# Patient Record
Sex: MALE | Race: WHITE | Employment: OTHER | URBAN - METROPOLITAN AREA
[De-identification: names, ages, dates, MRNs, and addresses within clinical notes are randomized per-mention and may not be internally consistent; named-entity substitution may affect disease eponyms.]

---

## 2021-10-01 ENCOUNTER — HOSPITAL ENCOUNTER (OUTPATIENT)
Dept: PREADMISSION TESTING | Age: 86
Discharge: HOME | End: 2021-10-01
Payer: MEDICARE

## 2021-10-01 ENCOUNTER — TRANSCRIBE ORDER (OUTPATIENT)
Dept: REGISTRATION | Age: 86
End: 2021-10-01

## 2021-10-01 DIAGNOSIS — Z01.812 BLOOD TESTS PRIOR TO TREATMENT OR PROCEDURE: ICD-10-CM

## 2021-10-01 DIAGNOSIS — R31.0 GROSS HEMATURIA: ICD-10-CM

## 2021-10-01 DIAGNOSIS — D41.4 NEOPLASM OF UNCERTAIN BEHAVIOR OF BLADDER: ICD-10-CM

## 2021-10-01 DIAGNOSIS — Z01.812 BLOOD TESTS PRIOR TO TREATMENT OR PROCEDURE: Primary | ICD-10-CM

## 2021-10-01 DIAGNOSIS — R31.0 GROSS HEMATURIA: Primary | ICD-10-CM

## 2021-10-01 LAB
ANION GAP SERPL CALC-SCNC: 9 MMOL/L (ref 3–18)
BASOPHILS # BLD: 0.1 K/UL (ref 0–0.1)
BASOPHILS NFR BLD: 1 % (ref 0–2)
BUN SERPL-MCNC: 15 MG/DL (ref 7–18)
BUN/CREAT SERPL: 12 (ref 12–20)
CALCIUM SERPL-MCNC: 9 MG/DL (ref 8.5–10.1)
CHLORIDE SERPL-SCNC: 101 MMOL/L (ref 100–111)
CO2 SERPL-SCNC: 26 MMOL/L (ref 21–32)
CREAT SERPL-MCNC: 1.23 MG/DL (ref 0.6–1.3)
DIFFERENTIAL METHOD BLD: ABNORMAL
EOSINOPHIL # BLD: 0.2 K/UL (ref 0–0.4)
EOSINOPHIL NFR BLD: 2 % (ref 0–5)
ERYTHROCYTE [DISTWIDTH] IN BLOOD BY AUTOMATED COUNT: 13.9 % (ref 11.6–14.5)
GLUCOSE SERPL-MCNC: 96 MG/DL (ref 74–99)
HCT VFR BLD AUTO: 40.4 % (ref 36–48)
HGB BLD-MCNC: 13.6 G/DL (ref 13–16)
LYMPHOCYTES # BLD: 2.1 K/UL (ref 0.9–3.6)
LYMPHOCYTES NFR BLD: 29 % (ref 21–52)
MCH RBC QN AUTO: 37 PG (ref 24–34)
MCHC RBC AUTO-ENTMCNC: 33.7 G/DL (ref 31–37)
MCV RBC AUTO: 109.8 FL (ref 78–100)
MONOCYTES # BLD: 1 K/UL (ref 0.05–1.2)
MONOCYTES NFR BLD: 14 % (ref 3–10)
NEUTS SEG # BLD: 3.7 K/UL (ref 1.8–8)
NEUTS SEG NFR BLD: 53 % (ref 40–73)
PLATELET # BLD AUTO: 206 K/UL (ref 135–420)
PMV BLD AUTO: 9.7 FL (ref 9.2–11.8)
POTASSIUM SERPL-SCNC: 4.6 MMOL/L (ref 3.5–5.5)
RBC # BLD AUTO: 3.68 M/UL (ref 4.35–5.65)
SODIUM SERPL-SCNC: 136 MMOL/L (ref 136–145)
WBC # BLD AUTO: 7 K/UL (ref 4.6–13.2)

## 2021-10-01 PROCEDURE — 36415 COLL VENOUS BLD VENIPUNCTURE: CPT

## 2021-10-01 PROCEDURE — 80048 BASIC METABOLIC PNL TOTAL CA: CPT

## 2021-10-01 PROCEDURE — 93005 ELECTROCARDIOGRAM TRACING: CPT

## 2021-10-01 PROCEDURE — 85025 COMPLETE CBC W/AUTO DIFF WBC: CPT

## 2021-10-02 LAB
ATRIAL RATE: 65 BPM
CALCULATED P AXIS, ECG09: 71 DEGREES
CALCULATED R AXIS, ECG10: 76 DEGREES
CALCULATED T AXIS, ECG11: 17 DEGREES
DIAGNOSIS, 93000: NORMAL
P-R INTERVAL, ECG05: 186 MS
Q-T INTERVAL, ECG07: 440 MS
QRS DURATION, ECG06: 130 MS
QTC CALCULATION (BEZET), ECG08: 457 MS
VENTRICULAR RATE, ECG03: 65 BPM

## 2021-10-05 ENCOUNTER — HOSPITAL ENCOUNTER (OUTPATIENT)
Dept: PREADMISSION TESTING | Age: 86
Discharge: HOME | End: 2021-10-05

## 2021-10-05 VITALS — HEIGHT: 69 IN | WEIGHT: 140 LBS | BODY MASS INDEX: 20.73 KG/M2

## 2021-10-05 RX ORDER — SIMVASTATIN 20 MG/1
20 TABLET, FILM COATED ORAL DAILY
COMMUNITY
End: 2021-10-23

## 2021-10-05 RX ORDER — LEVOFLOXACIN 5 MG/ML
500 INJECTION, SOLUTION INTRAVENOUS ONCE
Status: CANCELLED | OUTPATIENT
Start: 2021-10-05 | End: 2021-10-05

## 2021-10-05 RX ORDER — CALCIUM CARBONATE/VITAMIN D3 600 MG-10
TABLET ORAL DAILY
COMMUNITY

## 2021-10-05 RX ORDER — ASPIRIN 81 MG/1
162 TABLET ORAL DAILY
COMMUNITY
End: 2021-10-23

## 2021-10-05 RX ORDER — SODIUM CHLORIDE, SODIUM LACTATE, POTASSIUM CHLORIDE, CALCIUM CHLORIDE 600; 310; 30; 20 MG/100ML; MG/100ML; MG/100ML; MG/100ML
125 INJECTION, SOLUTION INTRAVENOUS CONTINUOUS
Status: CANCELLED | OUTPATIENT
Start: 2021-10-05

## 2021-10-05 NOTE — PERIOP NOTES
PAT - SURGICAL PRE-ADMISSION INSTRUCTIONS    NAME:  Kelly Erwin                                                          TODAY'S DATE:  10/5/2021    SURGERY DATE:  10/14/2021                                  SURGERY ARRIVAL TIME:   tba    1. Do NOT eat or drink anything, including candy or gum, after MIDNIGHT on 10/13/21 , unless you have specific instructions from your Surgeon or Anesthesia Provider to do so. 2. No smoking 24 hours before surgery. 3. No alcohol 24 hours prior to the day of surgery. 4. No recreational drugs for one week prior to the day of surgery. 5. Leave all valuables, including money/purse, at home. 6. Remove all jewelry, nail polish, makeup (including mascara); no lotions, powders, deodorant, or perfume/cologne/after shave. 7. Glasses/Contact lenses and Dentures may be worn to the hospital.  They will be removed prior to surgery. 8. Call your doctor if symptoms of a cold or illness develop within 24 ours prior to surgery. 9. AN ADULT MUST DRIVE YOU HOME AFTER OUTPATIENT SURGERY. 10. If you are having an OUTPATIENT procedure, please make arrangements for a responsible adult to be with you for 24 hours after your surgery. 11. If you are admitted to the hospital, you will be assigned to a bed after surgery is complete. Normally a family member will not be able to see you until you are in your assigned bed. 12. Visitation Restrictions Explained. Special Instructions:  Covid Test 10/11/21, Quarantine requirements discussed  Take these medications the morning of surgery with a sip of water:  Simvastatin  Will stop OTC supplement starting today  Patient Prep:    use CHG solution    These surgical instructions were reviewed with patient & home care aide during the PAT phone call.

## 2021-10-11 ENCOUNTER — HOSPITAL ENCOUNTER (OUTPATIENT)
Dept: PREADMISSION TESTING | Age: 86
Discharge: HOME | End: 2021-10-11
Payer: MEDICARE

## 2021-10-11 PROCEDURE — U0003 INFECTIOUS AGENT DETECTION BY NUCLEIC ACID (DNA OR RNA); SEVERE ACUTE RESPIRATORY SYNDROME CORONAVIRUS 2 (SARS-COV-2) (CORONAVIRUS DISEASE [COVID-19]), AMPLIFIED PROBE TECHNIQUE, MAKING USE OF HIGH THROUGHPUT TECHNOLOGIES AS DESCRIBED BY CMS-2020-01-R: HCPCS

## 2021-10-12 LAB — SARS-COV-2, COV2NT: NOT DETECTED

## 2021-10-13 ENCOUNTER — ANESTHESIA EVENT (OUTPATIENT)
Dept: SURGERY | Age: 86
End: 2021-10-13
Payer: MEDICARE

## 2021-10-13 NOTE — ANESTHESIA PREPROCEDURE EVALUATION
Relevant Problems   No relevant active problems       Anesthetic History               Review of Systems / Medical History  Patient summary reviewed, nursing notes reviewed and pertinent labs reviewed    Pulmonary          Smoker    Pertinent negatives: Pneumonia: LBBB. Neuro/Psych             Comments: Functional capacity and balance limitations. H/o vertigo, dizziness, syncope. Normal cardiac w/u. Cardiovascular              Hyperlipidemia  Pertinent negatives: No valvular problems/murmurs and CAD  Exercise tolerance: >4 METS: \"impaired functional capacity\"  Comments: Cath 10/2020:  1. Coronaries: Normal coronary arteries. 2. Left ventricle: Normal left ventricular systolic function. 3. Normal right atrial pressure.     4. Normal pulmonary capillary wedge pressure. 5. Normal pulmonary artery pressures. 6. Normal thermodilution cardiac output and cardiac index    Cleared by cardiology with cardiac \"risk for procedure slightly increased\"   GI/Hepatic/Renal               Comments: 7 drinks/week Endo/Other          Pertinent negatives: No obesity   Other Findings            Physical Exam    Airway  Mallampati: II  TM Distance: 4 - 6 cm  Neck ROM: normal range of motion   Mouth opening: Normal     Cardiovascular    Rhythm: regular  Rate: normal         Dental  No notable dental hx       Pulmonary  Breath sounds clear to auscultation               Abdominal  GI exam deferred       Other Findings            Anesthetic Plan    ASA: 3  Anesthesia type: general          Induction: Intravenous  Anesthetic plan and risks discussed with: Patient      Plan general anesthesia. Patient has no absolute contraindications to use of an LMA. Risks discussed and patient agrees with plan. GETA back-up if LMA placement unsuccessful. Anesthesia consent form reviewed. Patient given opportunity for questions and all questions answered. Anesthesia consent form signed by patient.

## 2021-10-13 NOTE — H&P
[facility] [address] [address]  Tel: [phone]  Fax: [phone]    Patient : Noah Steen   YOB: 1933   Birth Sex: Male      Current Gender: Male      Date:  10/13/2021 7:32 AM    Visit Type:   Pre Op   Assessment/Plan  # Detail Type Description    Assessment Urinary tract infection, site not specified (N39.0). Assessment Hematuria, unspecified (R31.9). 3. 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 4. Assessment Gross hematuria (R31.0). Patient Plan Patient with gross hematuria. He continues to have intermittent gross hematuria. His of his hematuria he underwent CT scan performed 08/24/2021 revealed 1.2 x 1.9 x 3.7 soft tissue mass at the base of the urinary bladder center left of midline suspicious for urothelial neoplasm abnormal soft tissue extends to the UV junction bilaterally and associated mild distension the distal right ureter the distal left ureter is normal in caliber  I reviewed the CT scan films with the patient and recommended that he undergo a transurethral resection of bladder tumor. I felt a cystoscopy office was not necessary since he has hematuria and it will be very difficult to see clearly into his bladder. I reviewed her transurethral resection bladder tumor all risks including pain infection bleeding bladder injury need for gemcitabine postoperatively and the fact that this may not be carcinoma were reviewed he understands will proceed              Additional Visit Information      This 80year old male presents for Hematuria. History of Present Illness  1. Hematuria   The patient presents with hematuria (gross). Pertinent history includes being at least 36years of age but not exposure to radiation and smoking or urolithiasis. The patient denies chills, fever, nausea and vomiting. Additional information: Pt with intermittent gross hematuria that began 4 mo ago.   He has no risk factors,No exposure to toxic chemicals or radiation. He has not smoked cigarettes in over 40 years. CT scan performed 08/24/2021 revealed 1.2 x 1.9 x 3.7 soft tissue mass at the base of the urinary bladder center left of midline suspicious for urothelial neoplasm abnormal soft tissue extends to the UV junction bilaterally and associated mild distension the distal right ureter the distal left ureter is normal in caliber. 10/13/21  Pt with papillary mass in bladder noted on cystoscopy and CT scan,  He is scheduled for [facility]BT at 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 N     Coronary arteriosclerosis 09/25/2013 N     Shoulder pain  N       Medications (active prior to today)  Medication Instructions Start Date Stop Date Refilled Elsewhere   Aspirin Low Dose 81 mg tablet,delayed release take 1 tablet by oral route 2 times every day 09/24/2019   N   simvastatin 20 mg tablet take 1 tablet by oral route  every day in the evening //   Y   gemcitabine 2 gram intravenous solution INSTILL 2 GRAM BY INTRAVESICAL ROUTE INTO THE BLADDER DILUTE WITH 50 ML SODIUM CHLORIDE 10/08/2021   N         Allergies  Ingredient Reaction (Severity) Medication Name Comment   SULFA (SULFONAMIDE ANTIBIOTICS) Wheezing         Review of Systems  System Neg/Pos Details   Constitutional Negative Chills and Fever. ENMT Negative Ear infections and Sore throat. Eyes Negative Blurred vision, Double vision and Eye pain. Respiratory Negative Asthma, Chronic cough, Dyspnea and Wheezing. Cardio Negative Chest pain. GI Negative Constipation, Decreased appetite, Diarrhea, Nausea and Vomiting.  Positive Hematuria. Endocrine Negative Cold intolerance, Heat intolerance, Increased thirst and Weight loss. Neuro Negative Headache and Tremors. Psych Negative Anxiety and Depression.    Integumentary Negative Itching skin and Rash. MS Negative Back pain and Joint pain. Hema/Lymph Negative Easy bleeding. Physical Exam  Exam Findings Details   Constitutional Normal Well developed. Neck Exam Normal Inspection - Normal.   Respiratory Normal Inspection - Normal.   Extremity Normal No edema. Neurological Normal Alert and oriented to person, place and time. Cranial nerves intact. No motor or sensory deficits. Psychiatric Normal Orientation - Oriented to time, place, person & situation. Appropriate mood and affect. Medications (added, continued, or stopped today)  Start Date Medication Directions PRN Status PRN Reason Instruction Stop Date   09/24/2019 Aspirin Low Dose 81 mg tablet,delayed release take 1 tablet by oral route 2 times every day N      10/08/2021 gemcitabine 2 gram intravenous solution INSTILL 2 GRAM BY INTRAVESICAL ROUTE INTO THE BLADDER DILUTE WITH 50 ML SODIUM CHLORIDE N       simvastatin 20 mg tablet take 1 tablet by oral route  every day in the evening N          Active Patient Care Team Members  Name Contact Agency Type Support Role Relationship Active Date Inactive Date Specialty   Anne Marie New   Patient provider PCP   [facility] BARBI Erwin    Spouse          Provider:    Kay Lenz MD 10/13/2021 7:39 AM     Document generated by:  Edi Christianson 10/13/2021 07:39 AM      ----------------------------------------------------------------------------------------------------------------------------------------------------------------------

## 2021-10-14 ENCOUNTER — ANESTHESIA (OUTPATIENT)
Dept: SURGERY | Age: 86
End: 2021-10-14
Payer: MEDICARE

## 2021-10-14 ENCOUNTER — APPOINTMENT (OUTPATIENT)
Dept: GENERAL RADIOLOGY | Age: 86
End: 2021-10-14
Attending: UROLOGY
Payer: MEDICARE

## 2021-10-14 ENCOUNTER — HOSPITAL ENCOUNTER (OUTPATIENT)
Age: 86
Setting detail: OUTPATIENT SURGERY
Discharge: HOME | End: 2021-10-14
Attending: UROLOGY | Admitting: UROLOGY
Payer: MEDICARE

## 2021-10-14 VITALS
DIASTOLIC BLOOD PRESSURE: 67 MMHG | OXYGEN SATURATION: 98 % | TEMPERATURE: 97 F | HEART RATE: 66 BPM | RESPIRATION RATE: 16 BRPM | HEIGHT: 69 IN | BODY MASS INDEX: 20.53 KG/M2 | SYSTOLIC BLOOD PRESSURE: 130 MMHG | WEIGHT: 138.6 LBS

## 2021-10-14 PROCEDURE — C2617 STENT, NON-COR, TEM W/O DEL: HCPCS | Performed by: UROLOGY

## 2021-10-14 PROCEDURE — 74011250636 HC RX REV CODE- 250/636: Performed by: ANESTHESIOLOGY

## 2021-10-14 PROCEDURE — 88305 TISSUE EXAM BY PATHOLOGIST: CPT

## 2021-10-14 PROCEDURE — C1758 CATHETER, URETERAL: HCPCS | Performed by: UROLOGY

## 2021-10-14 PROCEDURE — 76060000034 HC ANESTHESIA 1.5 TO 2 HR: Performed by: UROLOGY

## 2021-10-14 PROCEDURE — 88307 TISSUE EXAM BY PATHOLOGIST: CPT

## 2021-10-14 PROCEDURE — C1769 GUIDE WIRE: HCPCS | Performed by: UROLOGY

## 2021-10-14 PROCEDURE — 77030040361 HC SLV COMPR DVT MDII -B: Performed by: UROLOGY

## 2021-10-14 PROCEDURE — 74011250637 HC RX REV CODE- 250/637: Performed by: UROLOGY

## 2021-10-14 PROCEDURE — 76210000021 HC REC RM PH II 0.5 TO 1 HR: Performed by: UROLOGY

## 2021-10-14 PROCEDURE — 2709999900 HC NON-CHARGEABLE SUPPLY: Performed by: UROLOGY

## 2021-10-14 PROCEDURE — 76210000006 HC OR PH I REC 0.5 TO 1 HR: Performed by: UROLOGY

## 2021-10-14 PROCEDURE — 77030020782 HC GWN BAIR PAWS FLX 3M -B: Performed by: UROLOGY

## 2021-10-14 PROCEDURE — 76010000153 HC OR TIME 1.5 TO 2 HR: Performed by: UROLOGY

## 2021-10-14 PROCEDURE — 74011000636 HC RX REV CODE- 636: Performed by: UROLOGY

## 2021-10-14 PROCEDURE — 74011250636 HC RX REV CODE- 250/636: Performed by: UROLOGY

## 2021-10-14 PROCEDURE — 74011000250 HC RX REV CODE- 250: Performed by: ANESTHESIOLOGY

## 2021-10-14 PROCEDURE — 74420 UROGRAPHY RTRGR +-KUB: CPT

## 2021-10-14 DEVICE — URETERAL STENT
Type: IMPLANTABLE DEVICE | Site: URETER | Status: FUNCTIONAL
Brand: POLARIS™ ULTRA

## 2021-10-14 RX ORDER — FLUMAZENIL 0.1 MG/ML
0.2 INJECTION INTRAVENOUS
Status: DISCONTINUED | OUTPATIENT
Start: 2021-10-14 | End: 2021-10-14 | Stop reason: HOSPADM

## 2021-10-14 RX ORDER — LEVOFLOXACIN 5 MG/ML
500 INJECTION, SOLUTION INTRAVENOUS ONCE
Status: COMPLETED | OUTPATIENT
Start: 2021-10-14 | End: 2021-10-14

## 2021-10-14 RX ORDER — LIDOCAINE HYDROCHLORIDE 20 MG/ML
INJECTION, SOLUTION EPIDURAL; INFILTRATION; INTRACAUDAL; PERINEURAL AS NEEDED
Status: DISCONTINUED | OUTPATIENT
Start: 2021-10-14 | End: 2021-10-14 | Stop reason: HOSPADM

## 2021-10-14 RX ORDER — EPHEDRINE SULFATE/0.9% NACL/PF 50 MG/5 ML
SYRINGE (ML) INTRAVENOUS AS NEEDED
Status: DISCONTINUED | OUTPATIENT
Start: 2021-10-14 | End: 2021-10-14 | Stop reason: HOSPADM

## 2021-10-14 RX ORDER — ATROPA BELLADONNA AND OPIUM 16.2; 3 MG/1; MG/1
1 SUPPOSITORY RECTAL
Status: DISCONTINUED | OUTPATIENT
Start: 2021-10-14 | End: 2021-10-14 | Stop reason: HOSPADM

## 2021-10-14 RX ORDER — NALOXONE HYDROCHLORIDE 0.4 MG/ML
0.4 INJECTION, SOLUTION INTRAMUSCULAR; INTRAVENOUS; SUBCUTANEOUS AS NEEDED
Status: DISCONTINUED | OUTPATIENT
Start: 2021-10-14 | End: 2021-10-14 | Stop reason: HOSPADM

## 2021-10-14 RX ORDER — SODIUM CHLORIDE, SODIUM LACTATE, POTASSIUM CHLORIDE, CALCIUM CHLORIDE 600; 310; 30; 20 MG/100ML; MG/100ML; MG/100ML; MG/100ML
125 INJECTION, SOLUTION INTRAVENOUS CONTINUOUS
Status: DISCONTINUED | OUTPATIENT
Start: 2021-10-14 | End: 2021-10-14 | Stop reason: HOSPADM

## 2021-10-14 RX ORDER — SODIUM CHLORIDE 0.9 % (FLUSH) 0.9 %
5-40 SYRINGE (ML) INJECTION AS NEEDED
Status: DISCONTINUED | OUTPATIENT
Start: 2021-10-14 | End: 2021-10-14 | Stop reason: HOSPADM

## 2021-10-14 RX ORDER — FENTANYL CITRATE 50 UG/ML
INJECTION, SOLUTION INTRAMUSCULAR; INTRAVENOUS
Status: DISCONTINUED
Start: 2021-10-14 | End: 2021-10-14 | Stop reason: HOSPADM

## 2021-10-14 RX ORDER — FENTANYL CITRATE 50 UG/ML
50 INJECTION, SOLUTION INTRAMUSCULAR; INTRAVENOUS AS NEEDED
Status: DISCONTINUED | OUTPATIENT
Start: 2021-10-14 | End: 2021-10-14 | Stop reason: HOSPADM

## 2021-10-14 RX ORDER — FENTANYL CITRATE 50 UG/ML
INJECTION, SOLUTION INTRAMUSCULAR; INTRAVENOUS AS NEEDED
Status: DISCONTINUED | OUTPATIENT
Start: 2021-10-14 | End: 2021-10-14 | Stop reason: HOSPADM

## 2021-10-14 RX ORDER — PROPOFOL 10 MG/ML
INJECTION, EMULSION INTRAVENOUS AS NEEDED
Status: DISCONTINUED | OUTPATIENT
Start: 2021-10-14 | End: 2021-10-14 | Stop reason: HOSPADM

## 2021-10-14 RX ORDER — HYDROMORPHONE HYDROCHLORIDE 1 MG/ML
0.2 INJECTION, SOLUTION INTRAMUSCULAR; INTRAVENOUS; SUBCUTANEOUS
Status: DISCONTINUED | OUTPATIENT
Start: 2021-10-14 | End: 2021-10-14 | Stop reason: HOSPADM

## 2021-10-14 RX ORDER — ONDANSETRON 2 MG/ML
INJECTION INTRAMUSCULAR; INTRAVENOUS AS NEEDED
Status: DISCONTINUED | OUTPATIENT
Start: 2021-10-14 | End: 2021-10-14 | Stop reason: HOSPADM

## 2021-10-14 RX ORDER — FUROSEMIDE 10 MG/ML
INJECTION INTRAMUSCULAR; INTRAVENOUS AS NEEDED
Status: DISCONTINUED | OUTPATIENT
Start: 2021-10-14 | End: 2021-10-14 | Stop reason: HOSPADM

## 2021-10-14 RX ORDER — SODIUM CHLORIDE 0.9 % (FLUSH) 0.9 %
5-40 SYRINGE (ML) INJECTION EVERY 8 HOURS
Status: DISCONTINUED | OUTPATIENT
Start: 2021-10-14 | End: 2021-10-14 | Stop reason: HOSPADM

## 2021-10-14 RX ADMIN — ONDANSETRON HYDROCHLORIDE 4 MG: 2 INJECTION INTRAMUSCULAR; INTRAVENOUS at 07:54

## 2021-10-14 RX ADMIN — FENTANYL CITRATE 25 MCG: 50 INJECTION, SOLUTION INTRAMUSCULAR; INTRAVENOUS at 07:31

## 2021-10-14 RX ADMIN — SODIUM CHLORIDE, SODIUM LACTATE, POTASSIUM CHLORIDE, AND CALCIUM CHLORIDE 125 ML/HR: 600; 310; 30; 20 INJECTION, SOLUTION INTRAVENOUS at 06:10

## 2021-10-14 RX ADMIN — PROPOFOL 150 MG: 10 INJECTION, EMULSION INTRAVENOUS at 07:31

## 2021-10-14 RX ADMIN — LIDOCAINE HYDROCHLORIDE 75 MG: 20 INJECTION, SOLUTION INTRAVENOUS at 07:31

## 2021-10-14 RX ADMIN — FENTANYL CITRATE 25 MCG: 50 INJECTION, SOLUTION INTRAMUSCULAR; INTRAVENOUS at 08:08

## 2021-10-14 RX ADMIN — Medication 10 MG: at 07:41

## 2021-10-14 RX ADMIN — FENTANYL CITRATE 25 MCG: 50 INJECTION, SOLUTION INTRAMUSCULAR; INTRAVENOUS at 07:49

## 2021-10-14 RX ADMIN — FUROSEMIDE 10 MG: 10 INJECTION, SOLUTION INTRAVENOUS at 08:52

## 2021-10-14 RX ADMIN — LEVOFLOXACIN 500 MG: 5 INJECTION, SOLUTION INTRAVENOUS at 07:26

## 2021-10-14 RX ADMIN — ATROPA BELLADONNA AND OPIUM 1 SUPPOSITORY: 16.2; 3 SUPPOSITORY RECTAL at 09:42

## 2021-10-14 RX ADMIN — FENTANYL CITRATE 50 MCG: 50 INJECTION INTRAMUSCULAR; INTRAVENOUS at 09:12

## 2021-10-14 RX ADMIN — FENTANYL CITRATE 50 MCG: 50 INJECTION INTRAMUSCULAR; INTRAVENOUS at 09:29

## 2021-10-14 RX ADMIN — FENTANYL CITRATE 25 MCG: 50 INJECTION, SOLUTION INTRAMUSCULAR; INTRAVENOUS at 07:45

## 2021-10-14 RX ADMIN — FENTANYL CITRATE 50 MCG: 50 INJECTION, SOLUTION INTRAMUSCULAR; INTRAVENOUS at 08:45

## 2021-10-14 NOTE — PERIOP NOTES
Report to ISAIAH Mclaughlin ready for phase 2 care, requested an out note from anesthesia. Date Of Previous Biopsy (Optional): 04/02/2021

## 2021-10-14 NOTE — DISCHARGE INSTRUCTIONS
DISCHARGE SUMMARY from Nurse    PATIENT INSTRUCTIONS:    After general anesthesia or intravenous sedation, for 24 hours or while taking prescription Narcotics:  · Limit your activities  · Do not drive and operate hazardous machinery  · Do not make important personal or business decisions  · Do  not drink alcoholic beverages  · If you have not urinated within 8 hours after discharge, please contact your surgeon on call. Report the following to your surgeon:  · Excessive pain, swelling, redness or odor of or around the surgical area  · Temperature over 100.5  · Nausea and vomiting lasting longer than 4 hours or if unable to take medications  · Any signs of decreased circulation or nerve impairment to extremity: change in color, persistent  numbness, tingling, coldness or increase pain  · Any questions    What to do at Home:  [address] [place] [address] TO OFFICE ON Friday AT 8 AM FOR STENT AND CATHETER REMOVAL    If you experience any of the following symptoms heavy bleeding, no urine produced, fevers, severe pain, please follow up with dr Alesia Avila    *  Please give a list of your current medications to your Primary Care Provider. *  Please update this list whenever your medications are discontinued, doses are      changed, or new medications (including over-the-counter products) are added. *  Please carry medication information at all times in case of emergency situations. These are general instructions for a healthy lifestyle:    No smoking/ No tobacco products/ Avoid exposure to second hand smoke  Surgeon General's Warning:  Quitting smoking now greatly reduces serious risk to your health.     Obesity, smoking, and sedentary lifestyle greatly increases your risk for illness    A healthy diet, regular physical exercise & weight monitoring are important for maintaining a healthy lifestyle    You may be retaining fluid if you have a history of heart failure or if you experience any of the following symptoms:  Weight gain of 3 pounds or more overnight or 5 pounds in a week, increased swelling in our hands or feet or shortness of breath while lying flat in bed. Please call your doctor as soon as you notice any of these symptoms; do not wait until your next office visit. The discharge information has been reviewed with the patient and caregiver. The patient and caregiver verbalized understanding. Discharge medications reviewed with the patient and caregiver and appropriate educational materials and side effects teaching were provided. ___________________________________________________________________________________________________________________________________    Jill Alvarez M.D. [facility]  [address] [facility], [address] [place] [address], [facility]  Office: [phone]  Fax:    [phone]: Procedure(s):  [address] TUMOR W/GEMCITABINE    Notify [facility]rology IMMEDIATELY if any of the following occur:     You are unable to urinate. Urgency to urinate is not uncommon.  You find yourself urinating small frequent amounts associated with severe lower abdominal discomfort.  Bright red blood with clots in the urine. Some reddish urine is not uncommon and should be treated with increasing the amount of fluids you drink.  Temperature above 101.5° and / or chills.  You are nauseous and / or vomiting and you cannot hold down any fluids.  Your pain is not controlled with the pain medication prescribed. Special Considerations:      Do not drive for at least 24 hours after the procedure and until you are no longer taking narcotic pain medication and you are able to move and react without hesitation.       MEDICATIONS:  Pain   []  Norco®   []  Percocet® []  Dilaudid®    [x]  Tramadol   Antibiotics   []  Cipro   [x]  Keflex    [] Levaquin   []  Bactrim DS®       Urination   []  Vesicare®   [] Flomax     Burning   []  Pyridium®   []  UribelTM     Nausea   []  Zofran®   []  Phenergan®     Miscellaneous   []           [] Prescriptions Written on Chart    [x] Prescriptions sent Electronically           Our office will call you tomorrow to schedule your first follow-up appointment. Please contact [facility] at [phone] or go to the nearest Emergency Department / Urgent Care facility for any other medical questions or concerns.     Patient armband removed and shredded

## 2021-10-14 NOTE — INTERVAL H&P NOTE
Update History & Physical    The Patient's History and Physical of October 13, 2021 was reviewed with the patient and I examined the patient. There was no change. The surgical site was confirmed by the patient and me. Plan:  The risk, benefits, expected outcome, and alternative to the recommended procedure have been discussed with the patient. Patient understands and wants to proceed with the procedure.     Electronically signed by Edy Hernadez MD on 10/14/2021 at 6:37 AM

## 2021-10-14 NOTE — PERIOP NOTES
Dr. Ihsan Argueta called to the bedside patient in pain having bladder spasms orders given for B & O supp.

## 2021-10-14 NOTE — ANESTHESIA POSTPROCEDURE EVALUATION
Post-Anesthesia Evaluation and Assessment    Cardiovascular Function/Vital Signs  Visit Vitals  BP (!) 184/85 (BP 1 Location: Left upper arm, BP Patient Position: At rest)   Pulse 64   Temp 36.1 °C (97 °F)   Resp 16   Ht 5' 9\" (1.753 m)   Wt 62.9 kg (138 lb 9.6 oz)   SpO2 96%   BMI 20.47 kg/m²       Patient is status post Procedure(s):  CYSTOSCOPY TRANSURETHRAL RESECTION OF BLADDER TUMOR W/GEMCITABINE. Nausea/Vomiting: Controlled. Postoperative hydration reviewed and adequate. Pain:  Pain Scale 1: Numeric (0 - 10) (10/14/21 1007)  Pain Intensity 1: 2 (10/14/21 1007)   Managed. Neurological Status:   Neuro (WDL): Within Defined Limits (10/14/21 1007)   At baseline. Mental Status and Level of Consciousness: Arousable. Pulmonary Status:   O2 Device: None (Room air) (10/14/21 1007)   Adequate oxygenation and airway patent. Complications related to anesthesia: None    Post-anesthesia assessment completed. No concerns. Patient has met all discharge requirements.     Signed By: Conrad Santiago MD    October 14, 2021

## 2021-10-14 NOTE — PERIOP NOTES
TRANSFER - IN REPORT:    Verbal report received from ORN & CRNA on María Elena Erwin  being received from OR (unit) for routine post - op      Report consisted of patients Situation, Background, Assessment and   Recommendations(SBAR). Information from the following report(s) SBAR was reviewed with the receiving nurse. Opportunity for questions and clarification was provided. Assessment completed upon patients arrival to unit and care assumed. Received to pacu very restless and complains of pain thrashing arms and legs skin on extremities is thin, encouraged to relax and take deep breaths. Reoriented to place and time, encouraged to keep still to keep patient safe. Hood drains clear bloody urine.

## 2021-10-14 NOTE — PERIOP NOTES
Reviewed PTA medication list with patient/caregiver and patient/caregiver denies any additional medications. Patient admits to having a responsible adult care for them at home for at least 24 hours after surgery. Patient encouraged to use gown warming system and informed that using said warming gown to regulate body temperature prior to a procedure has been shown to help reduce the risks of blood clots and infection. Patient's pharmacy of choice verified and documented in PTA medication section. Dual skin assessment & fall risk band verification completed with Jesi Jo RN.

## 2021-10-14 NOTE — OP NOTES
Postoperative Note    Patient: Jose A Erwin  YOB: 1933  MRN: [id number]    Date of Procedure: 10/14/2021     Pre-Op Diagnosis: GROSS HEMATURIA    Post-Op Diagnosis: Neoplasm unknown origin bladder    Procedure(s):  CYSTOSCOPY TRANSURETHRAL RESECTION OF BLADDER TUMOR, bilateral retrograde pyelogram placement of bilateral double-J stents    Surgeon(s):  Angelia Colbert MD    Surgical Assistant: Kyle Palmer    Anesthesia: General     Estimated Blood Loss (mL): Minimal    Complications: None    Specimens:   ID Type Source Tests Collected by Time Destination   1 : BLADDER TUMOR Preservative Bladder  Angelia Colbert MD 10/14/2021 9082 Pathology        Implants:   Implant Name Type Inv. Item Serial No.  Lot No. LRB No. 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 After the administration of general anesthesia, was placed in lithotomy position. I performed a bimanual exam.  The bladder was completely mobile I was unable to feel a mass. .  At this point, I then began by placing a 21-Turkish cystoscope into the bladder. Cystourethroscopy was performed. The LEFT ureteral orifices could not be identified I and the RIGHT could not be identified as well. At this point I had them give intravenous Fluorescein, this would allow me to identify the ureteral orifices.   I identified the tumor, which was papillary in nature, located on the trigone extending over both ureteral orifices with more tissue covered on the left side of the bladder. .   I removed the cystoscope and then placed a 26-Romansh resectoscope into the bladder. Using the resection loop, I began to resect the entire tumor, f down to the muscle. During the resection I was able to see yellow streaming from both ureteral orifice ease. I used the cystoscope and placed a wire on the left side through the left ureteral orifice passed an open-ended catheter remove the wire and using 10 cc of Visipaque retrograde pyelogram was performed there was no hydronephrosis on the left side this confirmed also that we were in a good position in the ureter. I passed a 0.035 sensor wire through the open-ended catheter up into the collecting system remove the open-ended catheter. Once again confirmed with fluoroscopy. A 5 x 24 double-J stent was placed over the wire into the collecting system on the left side the wire was then removed. The string was left in place and brought out through the meatus. I then again using cystoscopic guidance placed a stent in the right side in similar fashion. I then resected some more tumor closer to the ureteral orifice on the right side. Once there was no evidence of any obvious residual tumor, I cauterized the entire base of the tumor. There were no residual lesions. There was no evidence of any obvious bleeding at this point. Both ureteral orifices remained intact with double-J stent in place. , The patient's bladder was then emptied and a 22 Dorma Gurney coudé catheter was placed with light pink efflux of urine. I did not use gemcitabine since I was trying to avoid reflux into the collecting systems. The catheter was secured to a bag. The patient was extubated transferred to PACU in stable and satisfactory condition.

## 2021-10-19 ENCOUNTER — HOSPITAL ENCOUNTER (INPATIENT)
Age: 86
LOS: 4 days | Discharge: HOME | DRG: 683 | End: 2021-10-23
Attending: EMERGENCY MEDICINE | Admitting: HOSPITALIST
Payer: MEDICARE

## 2021-10-19 DIAGNOSIS — R94.31 ABNORMAL EKG: ICD-10-CM

## 2021-10-19 DIAGNOSIS — E87.1 HYPONATREMIA: ICD-10-CM

## 2021-10-19 DIAGNOSIS — N30.00 ACUTE CYSTITIS WITHOUT HEMATURIA: ICD-10-CM

## 2021-10-19 DIAGNOSIS — I47.20 WIDE QRS VENTRICULAR TACHYCARDIA: ICD-10-CM

## 2021-10-19 DIAGNOSIS — N17.9 ACUTE RENAL FAILURE, UNSPECIFIED ACUTE RENAL FAILURE TYPE (HCC): Primary | ICD-10-CM

## 2021-10-19 DIAGNOSIS — I44.7 LBBB (LEFT BUNDLE BRANCH BLOCK): ICD-10-CM

## 2021-10-19 PROBLEM — N13.9 URINARY OBSTRUCTION: Status: ACTIVE | Noted: 2021-10-19

## 2021-10-19 PROBLEM — N39.0 UTI (URINARY TRACT INFECTION): Status: ACTIVE | Noted: 2021-10-19

## 2021-10-19 PROBLEM — R31.9 HEMATURIA: Status: ACTIVE | Noted: 2021-10-19

## 2021-10-19 LAB
ALBUMIN SERPL-MCNC: 3.4 G/DL (ref 3.4–5)
ALBUMIN/GLOB SERPL: 0.8 {RATIO} (ref 0.8–1.7)
ALP SERPL-CCNC: 67 U/L (ref 45–117)
ALT SERPL-CCNC: 23 U/L (ref 16–61)
ANION GAP SERPL CALC-SCNC: 12 MMOL/L (ref 3–18)
APPEARANCE UR: ABNORMAL
AST SERPL-CCNC: 23 U/L (ref 10–38)
ATRIAL RATE: 77 BPM
BACTERIA URNS QL MICRO: ABNORMAL /HPF
BASOPHILS # BLD: 0 K/UL (ref 0–0.1)
BASOPHILS NFR BLD: 0 % (ref 0–2)
BILIRUB SERPL-MCNC: 0.8 MG/DL (ref 0.2–1)
BILIRUB UR QL: ABNORMAL
BUN SERPL-MCNC: 92 MG/DL (ref 7–18)
BUN/CREAT SERPL: 13 (ref 12–20)
CALCIUM SERPL-MCNC: 8.8 MG/DL (ref 8.5–10.1)
CALCULATED P AXIS, ECG09: 74 DEGREES
CALCULATED R AXIS, ECG10: -44 DEGREES
CALCULATED T AXIS, ECG11: 101 DEGREES
CHLORIDE SERPL-SCNC: 94 MMOL/L (ref 100–111)
CK MB CFR SERPL CALC: 1.4 % (ref 0–4)
CK MB SERPL-MCNC: 1.7 NG/ML (ref 5–25)
CK SERPL-CCNC: 120 U/L (ref 39–308)
CO2 SERPL-SCNC: 22 MMOL/L (ref 21–32)
COLOR UR: ABNORMAL
CREAT SERPL-MCNC: 7 MG/DL (ref 0.6–1.3)
DIAGNOSIS, 93000: NORMAL
DIFFERENTIAL METHOD BLD: ABNORMAL
EOSINOPHIL # BLD: 0 K/UL (ref 0–0.4)
EOSINOPHIL NFR BLD: 0 % (ref 0–5)
EPITH CASTS URNS QL MICRO: ABNORMAL /LPF (ref 0–5)
ERYTHROCYTE [DISTWIDTH] IN BLOOD BY AUTOMATED COUNT: 13.7 % (ref 11.6–14.5)
GLOBULIN SER CALC-MCNC: 4.2 G/DL (ref 2–4)
GLUCOSE SERPL-MCNC: 111 MG/DL (ref 74–99)
GLUCOSE UR STRIP.AUTO-MCNC: NEGATIVE MG/DL
HCT VFR BLD AUTO: 39.6 % (ref 36–48)
HGB BLD-MCNC: 13.5 G/DL (ref 13–16)
HGB UR QL STRIP: ABNORMAL
KETONES UR QL STRIP.AUTO: ABNORMAL MG/DL
LEUKOCYTE ESTERASE UR QL STRIP.AUTO: ABNORMAL
LYMPHOCYTES # BLD: 1.1 K/UL (ref 0.9–3.6)
LYMPHOCYTES NFR BLD: 8 % (ref 21–52)
MAGNESIUM SERPL-MCNC: 2.6 MG/DL (ref 1.6–2.6)
MCH RBC QN AUTO: 36.5 PG (ref 24–34)
MCHC RBC AUTO-ENTMCNC: 34.1 G/DL (ref 31–37)
MCV RBC AUTO: 107 FL (ref 78–100)
MONOCYTES # BLD: 1.9 K/UL (ref 0.05–1.2)
MONOCYTES NFR BLD: 14 % (ref 3–10)
NEUTS SEG # BLD: 10.6 K/UL (ref 1.8–8)
NEUTS SEG NFR BLD: 77 % (ref 40–73)
NITRITE UR QL STRIP.AUTO: NEGATIVE
P-R INTERVAL, ECG05: 180 MS
PH UR STRIP: 6 [PH] (ref 5–8)
PLATELET # BLD AUTO: 256 K/UL (ref 135–420)
PMV BLD AUTO: 9.5 FL (ref 9.2–11.8)
POTASSIUM SERPL-SCNC: 4.5 MMOL/L (ref 3.5–5.5)
PROT SERPL-MCNC: 7.6 G/DL (ref 6.4–8.2)
PROT UR STRIP-MCNC: >300 MG/DL
Q-T INTERVAL, ECG07: 384 MS
QRS DURATION, ECG06: 108 MS
QTC CALCULATION (BEZET), ECG08: 576 MS
RBC # BLD AUTO: 3.7 M/UL (ref 4.35–5.65)
RBC #/AREA URNS HPF: ABNORMAL /HPF (ref 0–5)
SODIUM SERPL-SCNC: 128 MMOL/L (ref 136–145)
SP GR UR REFRACTOMETRY: 1.02 (ref 1–1.03)
TROPONIN I SERPL-MCNC: <0.02 NG/ML (ref 0–0.04)
UROBILINOGEN UR QL STRIP.AUTO: 0.2 EU/DL (ref 0.2–1)
VENTRICULAR RATE, ECG03: 135 BPM
WBC # BLD AUTO: 13.7 K/UL (ref 4.6–13.2)
WBC URNS QL MICRO: ABNORMAL /HPF (ref 0–5)

## 2021-10-19 PROCEDURE — 93005 ELECTROCARDIOGRAM TRACING: CPT

## 2021-10-19 PROCEDURE — 74011000250 HC RX REV CODE- 250: Performed by: EMERGENCY MEDICINE

## 2021-10-19 PROCEDURE — 83735 ASSAY OF MAGNESIUM: CPT

## 2021-10-19 PROCEDURE — 74011250636 HC RX REV CODE- 250/636: Performed by: HOSPITALIST

## 2021-10-19 PROCEDURE — 65270000029 HC RM PRIVATE

## 2021-10-19 PROCEDURE — 87086 URINE CULTURE/COLONY COUNT: CPT

## 2021-10-19 PROCEDURE — 81001 URINALYSIS AUTO W/SCOPE: CPT

## 2021-10-19 PROCEDURE — 74011250636 HC RX REV CODE- 250/636: Performed by: EMERGENCY MEDICINE

## 2021-10-19 PROCEDURE — 51798 US URINE CAPACITY MEASURE: CPT

## 2021-10-19 PROCEDURE — 85025 COMPLETE CBC W/AUTO DIFF WBC: CPT

## 2021-10-19 PROCEDURE — 94762 N-INVAS EAR/PLS OXIMTRY CONT: CPT

## 2021-10-19 PROCEDURE — 82553 CREATINE MB FRACTION: CPT

## 2021-10-19 PROCEDURE — 99285 EMERGENCY DEPT VISIT HI MDM: CPT

## 2021-10-19 PROCEDURE — 80053 COMPREHEN METABOLIC PANEL: CPT

## 2021-10-19 RX ORDER — SODIUM CHLORIDE 9 MG/ML
100 INJECTION, SOLUTION INTRAVENOUS CONTINUOUS
Status: DISCONTINUED | OUTPATIENT
Start: 2021-10-19 | End: 2021-10-21

## 2021-10-19 RX ORDER — ATORVASTATIN CALCIUM 10 MG/1
10 TABLET, FILM COATED ORAL DAILY
Status: DISCONTINUED | OUTPATIENT
Start: 2021-10-20 | End: 2021-10-23 | Stop reason: HOSPADM

## 2021-10-19 RX ORDER — LIDOCAINE HYDROCHLORIDE 20 MG/ML
JELLY TOPICAL
Status: COMPLETED | OUTPATIENT
Start: 2021-10-19 | End: 2021-10-19

## 2021-10-19 RX ADMIN — SODIUM CHLORIDE 1000 ML: 900 INJECTION, SOLUTION INTRAVENOUS at 14:17

## 2021-10-19 RX ADMIN — CEFTRIAXONE 1 G: 1 INJECTION, POWDER, FOR SOLUTION INTRAMUSCULAR; INTRAVENOUS at 14:17

## 2021-10-19 RX ADMIN — SODIUM CHLORIDE 100 ML/HR: 900 INJECTION, SOLUTION INTRAVENOUS at 18:55

## 2021-10-19 RX ADMIN — LIDOCAINE HYDROCHLORIDE: 20 JELLY TOPICAL at 13:15

## 2021-10-19 NOTE — ED TRIAGE NOTES
Pt arrives ambulatory to ED with c\o low blood pressure, pt sts he was at Dr eJsus Reeves office and had BP reading of 90 systolically, pt denies any symptoms at this time

## 2021-10-19 NOTE — CONSULTS
Nephrology Consult Note      Consult requesting Physican:   Dr Mavis Baird       Reason for Consult:   VALENTE    HPI:   Mr Jose Antonio Fernandez is 81 yo wm with PMHX of HLD and recent Dx of bladder tumour who was sent to the ER from his PCP office due to hypotension. Pt had recent TURB done on 10/14 for new dx of bladder tumour. After his recent procedure pt was not passing urine as much. He had some lower abd pain but denies fever, CP, SOB N/V. In the ER BP was normal and afebrile. Labs showed BUN/Cr 92/7.0 from 15/1.2 about 3 wks ago. In the ER grossman cath was placed and had ~700cc of bloody urine. Currently w/o complaints. No hx of HTN, DM or known cardiac disease. 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 Current Outpatient Medications:     aspirin delayed-release 81 mg tablet, Take 162 mg by mouth daily. , Disp: , Rfl:     simvastatin (ZOCOR) 20 mg tablet, Take 20 mg by mouth daily. , Disp: , Rfl:     cyanocobalamin, vitamin B-12, (VITAMIN B-12 PO), Take  by mouth daily. , Disp: , Rfl:     zinc gluconate 30 mg tab, Take  by mouth daily. , Disp: , Rfl:     iron fum-FA-B complex-C-mins 29 mg iron- 400 mcg tab, Take  by mouth daily. , Disp: , Rfl:     PM/SHx:     Active Ambulatory Problems     Diagnosis Date Noted    No Active Ambulatory Problems     Resolved Ambulatory Problems     Diagnosis Date Noted    No Resolved Ambulatory Problems     Past Medical History:   Diagnosis Date    Hypercholesteremia     Impaired functional mobility, balance, and endurance        SHx:     Social History     Socioeconomic History    Marital status:      Spouse name: Not on file    Number of children: Not on file    Years of education: Not on file    Highest education level: Not on file   Occupational History    Not on file   Tobacco Use    Smoking status: Never Smoker    Smokeless tobacco: Never Used   Substance and Sexual Activity    Alcohol use: Yes     Alcohol/week: 7.0 standard drinks     Types: 7 Shots of liquor per week    Drug use: Never    Sexual activity: Not on file   Other Topics Concern    Not on file   Social History Narrative    Not on file     Social Determinants of Health     Financial Resource Strain:     Difficulty of Paying Living Expenses:    Food Insecurity:     Worried About Running Out of Food in the Last Year:     [address] in the Last Year:    Transportation Needs:     Lack of Transportation (Medical):  Lack of Transportation (Non-Medical):    Physical Activity:     Days of Exercise per Week:     Minutes of Exercise per Session:    Stress:     Feeling of Stress :    Social Connections:     Frequency of Communication with Friends and Family:     Frequency of Social Gatherings with Friends and Family:     Attends Protestant Services:     Active Member of Clubs or Organizations:     Attends Club or Organization Meetings:     Marital Status:    Intimate Partner Violence:     Fear of Current or Ex-Partner:     Emotionally Abused:     Physically Abused:     Sexually Abused: Allergies:      Allergies   Allergen Reactions    Sulfa (Sulfonamide Antibiotics) Anaphylaxis       Review of Systems:   Review of System is negative except per HPI    Physical Assessment:     Visit Vitals  /73   Pulse 70   Temp 97.6 °F (36.4 °C)   Resp 17   Ht 5' 9\" (1.753 m)   Wt 62.6 kg (138 lb)   SpO2 98%   BMI 20.38 kg/m²       Intake/Output Summary (Last 24 hours) at 10/19/2021 1434  Last data filed at 10/19/2021 1426  Gross per 24 hour   Intake    Output 600 ml   Net -600 ml       General Appearance: no pain, no distress  Head: atraumatic  HEENT: no jaundice, moist oral mucosa  Neck: no JVD noted  Chest: LS clear to auscultation bilaterally  Heart: S1/S2, no murmur, gallop or rub, RRR  Adbomen: soft, nontender, BS active   : no flank tenderness, + grossman catheter w/ bloody urine   Skin: intact, no rash  Extremity: no edema, cyanosis or clubbing  MS: No joint deformity or tenderness  Neuro: conscious, alert, oriented x 3, no focal deficit    Kanchan Padilla MD    Work Number: [phone]

## 2021-10-19 NOTE — ED PROVIDER NOTES
EMERGENCY DEPARTMENT HISTORY AND PHYSICAL EXAM    Date: 10/19/2021  Patient Name: Kelly Erwin    History of Presenting Illness     Chief Complaint   Patient presents with    Hypotension         History Provided By: Patient    12:08 PM  Kelly Erwin is a 80 y.o. male with PMHX of high cholesterol, recently had a Hood in place for urinary retention that was DC'd 3 weeks ago who presents to the emergency department C/O low abdominal pain. Patient reports has had pain since a Hood catheter was removed. He is noted intermittent hematuria and difficulty emptying his bladder. He is also had some constipation and diarrhea. He is also had some nausea and vomiting. He denies any fever, chest pain, shortness of breath but does endorse feeling weak. No relieving or exacerbating factors identified. Was at his primary care office this morning and found to be hypotensive and was directed to the emergency department. PCP: Buddy Paris MD    Current Facility-Administered Medications   Medication Dose Route Frequency Provider Last Rate Last Admin    lidocaine (URO-JET/ GLYDO) 2 % jelly   Urethral NOW Miguel Ruiz MD        cefTRIAXone (ROCEPHIN) 1 g in sterile water (preservative free) 10 mL IV syringe  1 g IntraVENous NOW Miguel Ruiz MD        sodium chloride 0.9 % bolus infusion 1,000 mL  1,000 mL IntraVENous ONCE Miguel Ruiz MD         Current Outpatient Medications   Medication Sig Dispense Refill    aspirin delayed-release 81 mg tablet Take 162 mg by mouth daily.  simvastatin (ZOCOR) 20 mg tablet Take 20 mg by mouth daily.  cyanocobalamin, vitamin B-12, (VITAMIN B-12 PO) Take  by mouth daily.  zinc gluconate 30 mg tab Take  by mouth daily.  iron fum-FA-B complex-C-mins 29 mg iron- 400 mcg tab Take  by mouth daily.          Past History     Past Medical History:  Past Medical History:   Diagnosis Date    Hypercholesteremia     Impaired functional mobility, balance, and endurance        Past Surgical History:  Past Surgical History:   Procedure Laterality Date    HX CATARACT REMOVAL Bilateral     HX CERVICAL DISKECTOMY  2000    HX COLONOSCOPY         Family History:  No family history on file. Social History:  Social History     Tobacco Use    Smoking status: Never Smoker    Smokeless tobacco: Never Used   Substance Use Topics    Alcohol use: Yes     Alcohol/week: 7.0 standard drinks     Types: 7 Shots of liquor per week    Drug use: Never       Allergies: Allergies   Allergen Reactions    Sulfa (Sulfonamide Antibiotics) Anaphylaxis         Review of Systems   Review of Systems   Constitutional: Positive for fatigue. Negative for fever. Respiratory: Negative for shortness of breath. Cardiovascular: Negative for chest pain. Gastrointestinal: Positive for abdominal pain, constipation, diarrhea, nausea and vomiting. Genitourinary: Positive for difficulty urinating and hematuria. Neurological: Positive for weakness. All other systems reviewed and are negative.         Physical Exam     Vitals:    10/19/21 1124 10/19/21 1239 10/19/21 1245   BP: 130/69 (!) 142/86 (!) 143/73   Pulse: 89 79 75   Resp: 16 15 18   Temp: 97.6 °F (36.4 °C)     SpO2: 98%     Weight: 62.6 kg (138 lb)     Height: 5' 9\" (1.753 m)       Physical Exam    Nursing notes and vital signs reviewed    Constitutional: Non toxic appearing, elderly, moderate distress  Head: Normocephalic, Atraumatic  Eyes: EOMI  Neck: Supple  Cardiovascular: Regular rate and rhythm, no murmurs, rubs, or gallops  Chest: Normal work of breathing and chest excursion bilaterally  Lungs: Clear to ausculation bilaterally  Abdomen: Soft, suprapubic tenderness without rebound or guarding, otherwise nontender, non distended  Back: No evidence of trauma or deformity  Extremities: No evidence of trauma or deformity, no LE edema  Skin: Warm and dry, normal cap refill  Neuro: Alert and appropriate  Psychiatric: Normal mood and affect      Diagnostic Study Results     Labs -         Radiologic Studies -   No orders to display     CT Results  (Last 48 hours)    None        CXR Results  (Last 48 hours)    None          Medications given in the ED-  Medications   lidocaine (URO-JET/ GLYDO) 2 % jelly (has no administration in time range)   cefTRIAXone (ROCEPHIN) 1 g in sterile water (preservative free) 10 mL IV syringe (has no administration in time range)   sodium chloride 0.9 % bolus infusion 1,000 mL (has no administration in time range)         Medical Decision Making   I am the first provider for this patient. I reviewed the vital signs, available nursing notes, past medical history, past surgical history, family history and social history. Vital Signs-Reviewed the patient's vital signs. Pulse Oximetry Analysis - 98% on room air, not hypoxic     Cardiac Monitor:  Rate: 75 bpm  Rhythm: Normal sinus    EKG interpretation: (Preliminary)  EKG read by Dr. Blaire Chisholm at 12:19 PM  Normal sinus rhythm at a rate of 68 bpm, LA interval 180 ms, machine interpretation of 135 bpm is incorrect as they are counting the T waves as QRS complexes. Patient has a left bundle branch block, overall morphology is similar when compared to prior from October 1, 2021    Records Reviewed: Nursing Notes, Old Medical Records and Previous electrocardiograms    Provider Notes (Medical Decision Making): Silva Enamorado is a 80 y.o. male presenting for reported hypotension at primary care office today. Patient not hypotensive here but did have significant urinary retention and new acute renal failure. Hood catheter placed. Urine consistent with UTI. Culture sent and IV antibiotics initiated. Discussed with hospitalist and nephrology for further in-hospital evaluation management. Patient understands and agrees with this plan.     Procedures:  Procedures    ED Course:   CONSULT NOTE:   1:34 PM  Dr. Blaire Chisholm spoke with Dr. Young Davila   Specialty: Hospitalist  Discussed pt's hx, disposition, and available diagnostic and imaging results over the telephone. Reviewed care plans. Accepts to medical.     CONSULT NOTE:   1:37 PM  Dr. Tavo Oliva spoke with Dr. Effie García   Specialty: Nephrology  Discussed pt's hx, disposition, and available diagnostic and imaging results over the telephone. Reviewed care plans. Will consult on the patient. 1:40 PM  Updated patient on all results and plan. All questions answered. Diagnosis and Disposition     Critical Care Time: None    Core Measures:  For Hospitalized Patients:    1. Hospitalization Decision Time:  The decision to hospitalize the patient was made by Dr. Tavo Oliva at 1:25 PM on 10/19/2021    2. Aspirin: Aspirin was not given because the patient did not present with a stroke at the time of their Emergency Department evaluation    1:41 PM  Patient is being admitted to the hospital by Dr. Yu Singh. The results of their tests and reasons for their admission have been discussed with them and/or available family. They convey agreement and understanding for the need to be admitted and for their admission diagnosis. CONDITIONS ON ADMISSION:  Sepsis is not present at the time of admission. Deep Vein Thrombosis is not present at the time of admission. Thrombosis is not present at the time of admission. Urinary Tract Infection is present at the time of admission. Pneumonia is not present at the time of admission. MRSA is not present at the time of admission. Wound infection is not present at the time of admission. Pressure Ulcer is not present at the time of admission. CLINICAL IMPRESSION:    1. Acute renal failure, unspecified acute renal failure type ([address]    2. Acute cystitis without hematuria    3. Hyponatremia      _______________________________      Please note that this dictation was completed with "[employer] the [employer] voice recognition software.   Quite often unanticipated grammatical, syntax, homophones, and other interpretive errors are inadvertently transcribed by the computer software. Please disregard these errors. Please excuse any errors that have escaped final proofreading.

## 2021-10-19 NOTE — H&P
History & Physical    Patient: Alice Glynn MRN: [id number]  CSN: [id number]    YOB: 1933  Age: 80 y.o. Sex: male      DOA: 10/19/2021  Primary Care Provider:  Buddy Paris MD      Assessment/Plan     Patient Active Problem List   Diagnosis Code    Acute renal failure (ARF) ([facility] [address] N17.9    Bladder tumor D49.4    Hematuria R31.9    Hyponatremia E87.1    Urinary obstruction N13.9    UTI (urinary tract infection) N39.0       Admit to medical floor    Acute kidney injury-  Likely secondary to urinary obstruction. Seen by nephrology  Follow renal function    Urinary obstruction-  S/p Hood  Urology consulted by ER    Gross hematuria-  Hood in place, may need CBI    UTI-  On ceftriaxone, follow urine cultures. Hyponatremia-  Hypovolemic,  Started on gentle IV fluids    Bladder tumor-  S/p bladder tumor resection on 10/14/2021. DVT prophylaxis with SCD secondary to hematuria    Estimated length of stay : 2 to 3 days    CC: Hypotension       HPI:     Alice Glynn is a 80 y.o. male with bladder tumor, s/p resection on 10/14/2021, hypercholesterolemia is sent to ER from his PCP office. Patient reports that he has been feeling abdominal discomfort since the procedure. He had bladder tumor resection done on 10/14/2021 for bladder tumor. He has not been passing urine since then. He was seen by his PCP today and he was noted to be hypotensive and sent to ER. He denies any fever chills, chest pain, shortness of breath, nausea, vomiting. He is vaccinated against COVID-19. In ER his BUN/creatinine 92/7. His previous renal function 3 weeks ago in normal range. His sodium at 128, WBC of 13.7 he had Hood placed with the 700 mL of bloody urine output.     Past Medical History:   Diagnosis Date    Bladder tumor     Hypercholesteremia     Impaired functional mobility, balance, and endurance        Past Surgical History:   Procedure Laterality Date    HX CATARACT REMOVAL Bilateral     HX CERVICAL DISKECTOMY  2000    HX COLONOSCOPY         No family history on file. Social History     Socioeconomic History    Marital status:      Spouse name: Not on file    Number of children: Not on file    Years of education: Not on file    Highest education level: Not on file   Tobacco Use    Smoking status: Never Smoker    Smokeless tobacco: Never Used   Substance and Sexual Activity    Alcohol use: Yes     Alcohol/week: 7.0 standard drinks     Types: 7 Shots of liquor per week    Drug use: Never     Social Determinants of Health     Financial Resource Strain:     Difficulty of Paying Living Expenses:    Food Insecurity:     Worried About Running Out of Food in the Last Year:     Ran Out of Food in the Last Year:    Transportation Needs:     Lack of Transportation (Medical):  Lack of Transportation (Non-Medical):    Physical Activity:     Days of Exercise per Week:     Minutes of Exercise per Session:    Stress:     Feeling of Stress :    Social Connections:     Frequency of Communication with Friends and Family:     Frequency of Social Gatherings with Friends and Family:     Attends Protestant Services:     Active Member of Clubs or Organizations:     Attends Club or Organization Meetings:     Marital Status:        Prior to Admission medications    Medication Sig Start Date End Date Taking? Authorizing Provider   aspirin delayed-release 81 mg tablet Take 162 mg by mouth daily. Provider, Historical   simvastatin (ZOCOR) 20 mg tablet Take 20 mg by mouth daily. Provider, Historical   cyanocobalamin, vitamin B-12, (VITAMIN B-12 PO) Take  by mouth daily. Provider, Historical   zinc gluconate 30 mg tab Take  by mouth daily. Provider, Historical   iron fum-FA-B complex-C-mins 29 mg iron- 400 mcg tab Take  by mouth daily.     Provider, Historical       Allergies   Allergen Reactions    Sulfa (Sulfonamide Antibiotics) Anaphylaxis       Review of Systems  Gen: No fever, chills, malaise, weight loss/gain. Heent: No headache, rhinorrhea, epistaxis, ear pain, hearing loss, sinus pain, neck pain/stiffness, sore throat. Heart: No chest pain, palpitations, MITCHELL, pnd, or orthopnea. Resp: No cough, hemoptysis, wheezing and shortness of breath. GI: No nausea, vomiting, diarrhea, constipation, melena or hematochezia. : see above   Derm: No rash, new skin lesion or pruritis. Musc/skeletal: no bone or joint complains. Vasc: No edema, cyanosis or claudication. Endo: No heat/cold intolerance, no polyuria,polydipsia or polyphagia. Neuro: No unilateral weakness, numbness, tingling. No seizures. Heme: No easy bruising or bleeding. Physical Exam:     Physical Exam:  Visit Vitals  /73 (BP 1 Location: Left upper arm, BP Patient Position: At rest)   Pulse 72   Temp 97.4 °F (36.3 °C)   Resp 18   Ht 5' 9\" (1.753 m)   Wt 62.6 kg (138 lb)   SpO2 98%   BMI 20.38 kg/m²      O2 Device: None (Room air)    Temp (24hrs), Av.5 °F (36.4 °C), Min:97.4 °F (36.3 °C), Max:97.6 °F (36.4 °C)    10/19 0701 - 10/19 1900  In: -   Out: 600 [Urine:600]   No intake/output data recorded. General:  Awake, cooperative, no distress. Head:  Normocephalic, without obvious abnormality, atraumatic. Eyes:  Conjunctivae/corneas clear, sclera anicteric, PERRL, EOMs intact. Nose: Nares normal. No drainage or sinus tenderness. Throat: Lips, mucosa, and tongue normal.    Neck: Supple, symmetrical, trachea midline, no adenopathy. Lungs:   Clear to auscultation bilaterally. Heart:   S1, S2, no murmur, click, rub or gallop. Abdomen: Soft, non-tender. Bowel sounds normal. No masses,  No organomegaly. Extremities: Extremities normal, atraumatic, no cyanosis or edema. Capillary refill normal.   Pulses: 2+ and symmetric all extremities. Skin: Skin color pink, turgor normal. No rashes or lesions   Neurologic: CNII-XII intact. No focal motor or sensory deficit.        Labs Reviewed:    CMP:   Lab Results   Component Value Date/Time     (L) 10/19/2021 12:43 PM    K 4.5 10/19/2021 12:43 PM    CL 94 (L) 10/19/2021 12:43 PM    CO2 22 10/19/2021 12:43 PM    AGAP 12 10/19/2021 12:43 PM     (H) 10/19/2021 12:43 PM    BUN 92 (H) 10/19/2021 12:43 PM    CREA 7.00 (H) 10/19/2021 12:43 PM    GFRAA 9 (L) 10/19/2021 12:43 PM    GFRNA 7 (L) 10/19/2021 12:43 PM    CA 8.8 10/19/2021 12:43 PM    MG 2.6 10/19/2021 12:43 PM    ALB 3.4 10/19/2021 12:43 PM    TP 7.6 10/19/2021 12:43 PM    GLOB 4.2 (H) 10/19/2021 12:43 PM    AGRAT 0.8 10/19/2021 12:43 PM    ALT 23 10/19/2021 12:43 PM     CBC:   Lab Results   Component Value Date/Time    WBC 13.7 (H) 10/19/2021 12:43 PM    HGB 13.5 10/19/2021 12:43 PM    HCT 39.6 10/19/2021 12:43 PM     10/19/2021 12:43 PM         Procedures/imaging: see electronic medical records for all procedures/Xrays and details which were not copied into this note but were reviewed prior to creation of Plan    Please note that this dictation was completed with [employer], the [employer] voice recognition software. Quite often unanticipated grammatical, syntax, homophones, and other interpretive errors are inadvertently transcribed by the computer software. Please disregard these errors. Please excuse any errors that have escaped final proofreading.         CC: Rozena Barthel, MD

## 2021-10-20 ENCOUNTER — APPOINTMENT (OUTPATIENT)
Dept: ULTRASOUND IMAGING | Age: 86
DRG: 683 | End: 2021-10-20
Attending: HOSPITALIST
Payer: MEDICARE

## 2021-10-20 LAB
ANION GAP SERPL CALC-SCNC: 11 MMOL/L (ref 3–18)
ANION GAP SERPL CALC-SCNC: 8 MMOL/L (ref 3–18)
BACTERIA SPEC CULT: NORMAL
BUN SERPL-MCNC: 76 MG/DL (ref 7–18)
BUN SERPL-MCNC: 84 MG/DL (ref 7–18)
BUN/CREAT SERPL: 15 (ref 12–20)
BUN/CREAT SERPL: 18 (ref 12–20)
CALCIUM SERPL-MCNC: 7.6 MG/DL (ref 8.5–10.1)
CALCIUM SERPL-MCNC: 7.7 MG/DL (ref 8.5–10.1)
CHLORIDE SERPL-SCNC: 102 MMOL/L (ref 100–111)
CHLORIDE SERPL-SCNC: 107 MMOL/L (ref 100–111)
CO2 SERPL-SCNC: 20 MMOL/L (ref 21–32)
CO2 SERPL-SCNC: 22 MMOL/L (ref 21–32)
CREAT SERPL-MCNC: 4.33 MG/DL (ref 0.6–1.3)
CREAT SERPL-MCNC: 5.5 MG/DL (ref 0.6–1.3)
ERYTHROCYTE [DISTWIDTH] IN BLOOD BY AUTOMATED COUNT: 13.4 % (ref 11.6–14.5)
GLUCOSE SERPL-MCNC: 118 MG/DL (ref 74–99)
GLUCOSE SERPL-MCNC: 90 MG/DL (ref 74–99)
HCT VFR BLD AUTO: 32.2 % (ref 36–48)
HGB BLD-MCNC: 11.2 G/DL (ref 13–16)
MAGNESIUM SERPL-MCNC: 2.3 MG/DL (ref 1.6–2.6)
MCH RBC QN AUTO: 36.1 PG (ref 24–34)
MCHC RBC AUTO-ENTMCNC: 34.8 G/DL (ref 31–37)
MCV RBC AUTO: 103.9 FL (ref 78–100)
PLATELET # BLD AUTO: 212 K/UL (ref 135–420)
PMV BLD AUTO: 9.8 FL (ref 9.2–11.8)
POTASSIUM SERPL-SCNC: 4.5 MMOL/L (ref 3.5–5.5)
POTASSIUM SERPL-SCNC: 4.7 MMOL/L (ref 3.5–5.5)
RBC # BLD AUTO: 3.1 M/UL (ref 4.35–5.65)
SERVICE CMNT-IMP: NORMAL
SODIUM SERPL-SCNC: 133 MMOL/L (ref 136–145)
SODIUM SERPL-SCNC: 137 MMOL/L (ref 136–145)
WBC # BLD AUTO: 10.1 K/UL (ref 4.6–13.2)

## 2021-10-20 PROCEDURE — 65270000029 HC RM PRIVATE

## 2021-10-20 PROCEDURE — 74011000250 HC RX REV CODE- 250: Performed by: HOSPITALIST

## 2021-10-20 PROCEDURE — 80048 BASIC METABOLIC PNL TOTAL CA: CPT

## 2021-10-20 PROCEDURE — 74011250637 HC RX REV CODE- 250/637: Performed by: HOSPITALIST

## 2021-10-20 PROCEDURE — 83735 ASSAY OF MAGNESIUM: CPT

## 2021-10-20 PROCEDURE — 36415 COLL VENOUS BLD VENIPUNCTURE: CPT

## 2021-10-20 PROCEDURE — 74011250636 HC RX REV CODE- 250/636: Performed by: HOSPITALIST

## 2021-10-20 PROCEDURE — 74011250637 HC RX REV CODE- 250/637: Performed by: INTERNAL MEDICINE

## 2021-10-20 PROCEDURE — 85027 COMPLETE CBC AUTOMATED: CPT

## 2021-10-20 PROCEDURE — 76770 US EXAM ABDO BACK WALL COMP: CPT

## 2021-10-20 RX ORDER — ACETAMINOPHEN 325 MG/1
650 TABLET ORAL
Status: DISCONTINUED | OUTPATIENT
Start: 2021-10-20 | End: 2021-10-23 | Stop reason: HOSPADM

## 2021-10-20 RX ADMIN — WATER 1 G: 1 INJECTION INTRAMUSCULAR; INTRAVENOUS; SUBCUTANEOUS at 13:56

## 2021-10-20 RX ADMIN — SODIUM CHLORIDE 100 ML/HR: 900 INJECTION, SOLUTION INTRAVENOUS at 17:19

## 2021-10-20 RX ADMIN — ACETAMINOPHEN 650 MG: 325 TABLET ORAL at 08:18

## 2021-10-20 RX ADMIN — SODIUM CHLORIDE 100 ML/HR: 900 INJECTION, SOLUTION INTRAVENOUS at 04:15

## 2021-10-20 RX ADMIN — ATORVASTATIN CALCIUM 10 MG: 10 TABLET, FILM COATED ORAL at 08:18

## 2021-10-20 NOTE — PROGRESS NOTES
Reason for Admission:   Hypotension                    RUR Score:   Mod; 13%               PCP: First and Last name:   Neptali Lang MD     Name of Practice:    Are you a current patient: Yes/No:    Approximate date of last visit:    Can you participate in a virtual visit if needed:     Do you (patient/family) have any concerns for transition/discharge? Plan for utilizing home health:   Yes    Current Advanced Directive/Advance Care Plan:  Full Code      Healthcare Decision Maker:   Click here to complete [address] including selection of the Healthcare Decision Maker Relationship (ie \"Primary\")              [facility]        Chart reviewed. Per H&P \"Tenzin Erwin is a 80 y.o. male with bladder tumor, s/p TURB on 10/14/2021, hypercholesterolemia is sent to ER from his PCP office. Patient reports that he has been feeling abdominal discomfort since the procedure. He had TURP done on 10/14/2021 for bladder tumor. He has not been passing urine since then. He was seen by his PCP today and he was noted to be hypotensive and sent to ER. He denies any fever chills, chest pain, shortness of breath, nausea, vomiting. He is vaccinated against COVID-19. In ER his BUN/creatinine 92/7. His previous renal function 3 weeks ago in normal range. His sodium at 128, WBC of 13.7 he had Hood placed with the 700 mL of bloody urine output. \"      CM spoke with  pt's daughter, Jossie Parekh ([phone]) and she has indicated pt has 2 caregivers ([id number] [phone]; [facility] [phone]). Pt's daughter indicated pt has a RW available if needed, however, pt ambulates independently. Pt/family would prefer to make their own follow up appointments. Daughter has indicated she would like HH (PT only) upon discharge. Family would like to speak with pt's caregivers to assist with selecting a [facility] agency.   CM to continue to follow and assist.      Care Management Interventions  Mode of Transport at Discharge:  Other (see comment) (Family/caregiver)  Transition of Care Consult (CM Consult): Home Health, Discharge Planning  Health Maintenance Reviewed: Yes  Support Systems: Child(ajay), Caregiver/Home Care Staff  Confirm Follow Up Transport: Family  The Plan for Transition of Care is Related to the Following Treatment Goals : HH and physician follow up      The Patient and/or Patient Representative was Provided with a Choice of Provider and Agrees with the Discharge Plan?: Yes  Name of the Patient Representative Who was Provided with a Choice of Provider and Agrees with the Discharge Plan: daughter/Karen Erwin  Freedom of Choice List was Provided with Basic Dialogue that Supports the Patient's Individualized Plan of Care/Goals, Treatment Preferences and Shares the Quality Data Associated with the Providers?: Yes  Discharge Location  Discharge Placement: Home with home health

## 2021-10-20 NOTE — ROUTINE PROCESS
Verbal shift change  Received from Charline Sutton, [facility] (outgoing nurse), to ISAIAH Bautista (oncoming)  Pt. Is AOX 4. IV patent and infusing well, Pt. denies  pain at this time. Report included the following information SBAR, Kardex, Procedure Summary, Intake/Output, MAR, Recent Lab Results, and  Cardiac Rhythm @ SR. Will resume care and monitor Pt. Condition. Pt. Educated on call bell when in need of help and assistance. Pt. verbalized understanding. Pt. Head to toe Assessment Done and documented. 2200  Pt. Not in distress. 2300  Pt made no [id number]. 0000  Pt. Resting with eyes closed, easily awaken. 0200  Pt. Not in pain. 0400  Pt. Denies discomfort.    0600  Pt. Able to rest and sleep well throughout the shift.      Verbal and bedside Shift changed report given to Jorge Willingham RN (oncoming RN) on Pt. Condition. Report consisted of patients Situation, History, Activities, intake/output,  Background, Assessment and Recommendations(SBAR).       Information from the following report(s) Kardex, order Summary, Lab results and MAR was reviewed with the receiving nurse.     Opportunity for questions and clarification was provided.

## 2021-10-20 NOTE — PROGRESS NOTES
Problem: Falls - Risk of  Goal: *Absence of Falls  Description: Document Alis Fallon Fall Risk and appropriate interventions in the flowsheet.   Outcome: Progressing Towards Goal  Note: Fall Risk Interventions:  Mobility Interventions: Bed/chair exit alarm, Patient to call before getting OOB         Medication Interventions: Bed/chair exit alarm, Patient to call before getting OOB    Elimination Interventions: Bed/chair exit alarm, Call light in reach, Toileting schedule/hourly rounds              Problem: Patient Education: Go to Patient Education Activity  Goal: Patient/Family Education  Outcome: Progressing Towards Goal

## 2021-10-20 NOTE — PROGRESS NOTES
Bedside and verbal shift change report recieved from [address] [place] [address] (offgoing nurse) given to Steve Watson RN (oncoming nurse). Report included the following information SBAR, Kardex, Intake/Output, MAR and Recent Results       1152- Patient rhythm changed from SR to V-Tach with a / 43.2 seconds. Patient assessed, MD paged. Awaiting call back at this time. 1420- Started CBI per MD order. Patient tolerating well, no c/o voiced. 1202- Received call back from Dr. Mandy Cash advised of patient change in heart rhythm, no new orders at this time. Bedside and verbal shift change report given to [address] [place] [address] (oncoming nurse) by Dana Villavicencio RN (offgoing nurse).  Report included the following information SBAR, Kardex, Intake/Output, MAR and Recent Results

## 2021-10-20 NOTE — PROGRESS NOTES
Hospitalist Progress Note    Patient: Kaley Johnston MRN: [id number]  CSN: [id number]    YOB: 1933  Age: 80 y.o. Sex: male    DOA: 10/19/2021 LOS:  LOS: 1 day                Assessment/Plan     Patient Active Problem List   Diagnosis Code    Acute renal failure (ARF) ([facility] [address] N17.9    Bladder tumor D49.4    Hematuria R31.9    Hyponatremia E87.1    Urinary obstruction N13.9    UTI (urinary tract infection) N39.0        Chief complaint :  80 y.o. male with bladder tumor, s/p TURB on 10/14/2021, hypercholesterolemia is sent to ER from his PCP office for hypotension. In ER he is noted to have elevated creatine at 7. Creatine 3 weeks ago in normal range. Urinary obstruction. EKG with LBBB, NSR  Tele monitor with beat of vtach, discussed with cardiology, given LBBB, he has wide QRS. patient is asymptomatic. Potassium and mag in normal range  Echo ordered. Acute kidney injury-  Likely secondary to urinary obstruction. Nephrology following     Urinary obstruction-  S/p Hood  Urology consulted      Gross hematuria-  Hood in place, CBI ordered  Discussed with Dr. Giulia Rutherford,      UTI-  On ceftriaxone, follow urine cultures.     Hyponatremia-  improving     Bladder tumor-  S/p bladder tumor resection on 10/14/2021.     DVT prophylaxis with SCD secondary to hematuria       Disposition : 2-3 days    Review of systems  General: No fevers or chills. Cardiovascular: No chest pain or pressure. No palpitations. Pulmonary: No shortness of breath. Gastrointestinal: No nausea, vomiting. Physical Exam:  General: Awake, cooperative, no acute distress    HEENT: NC, Atraumatic. PERRLA, anicteric sclerae. Lungs: CTA Bilaterally. No Wheezing/Rhonchi/Rales. Heart:  S1 S2,  No murmur, No Rubs, No Gallops  Abdomen: Soft, Non distended, Non tender.  +Bowel sounds,   Extremities: No c/c/e  Psych:   Not anxious or agitated. Neurologic:  No acute neurological deficit.          Vital signs/Intake and Output:  Visit Vitals  BP (!) 150/63 (BP 1 Location: Left upper arm)   Pulse 71   Temp 98.3 °F (36.8 °C)   Resp 16   Ht 5' 9\" (1.753 m)   Wt 62.6 kg (138 lb)   SpO2 96%   BMI 20.38 kg/m²     Current Shift:  No intake/output data recorded. Last three shifts:  10/19 0701 - 10/20 1900  In: 1448.3 [P.O.:240; I.V.:1208.3]  Out: 49728 [REQEI:04530]            Labs: Results:       Chemistry Recent Labs     10/20/21  0225 10/19/21  1243   GLU 90 111*   * 128*   K 4.7 4.5    94*   CO2 20* 22   BUN 84* 92*   CREA 5.50* 7.00*   CA 7.6* 8.8   AGAP 11 12   BUCR 15 13   AP  --  67   TP  --  7.6   ALB  --  3.4   GLOB  --  4.2*   AGRAT  --  0.8      CBC w/Diff Recent Labs     10/20/21  0225 10/19/21  1243   WBC 10.1 13.7*   RBC 3.10* 3.70*   HGB 11.2* 13.5   HCT 32.2* 39.6    256   GRANS  --  77*   LYMPH  --  8*   EOS  --  0      Cardiac Enzymes Recent Labs     10/19/21  1243      CKND1 1.4      Coagulation No results for input(s): PTP, INR, APTT, INREXT in the last 72 hours. Lipid Panel No results found for: CHOL, CHOLPOCT, CHOLX, CHLST, CHOLV, 678673, HDL, HDLP, LDL, LDLC, DLDLP, 094278, VLDLC, VLDL, TGLX, TRIGL, TRIGP, TGLPOCT, CHHD, CHHDX   BNP No results for input(s): BNPP in the last 72 hours.    Liver Enzymes Recent Labs     10/19/21  1243   TP 7.6   ALB 3.4   AP 67      Thyroid Studies No results found for: T4, T3U, TSH, TSHEXT     Procedures/imaging: see electronic medical records for all procedures/Xrays and details which were not copied into this note but were reviewed prior to creation of Plan

## 2021-10-20 NOTE — PROGRESS NOTES
Nephrology Progress Note        HPI:   Mr Linda Lazo is 79 yo wm with PMHX of HLD and recent Dx of bladder tumour who was sent to the ER from his PCP office due to hypotension. Pt had recent TURB done on 10/14 for new dx of bladder tumour. After his recent procedure pt was not passing urine as much. He had some lower abd pain but denies fever, CP, SOB N/V. In the ER BP was normal and afebrile. Labs showed BUN/Cr 92/7.0 from 15/1.2 about 3 wks ago. In the ER grossman cath was placed and had ~700cc of bloody urine. Currently w/o complaints. No hx of HTN, DM or known cardiac disease.     -Pt w/o new complaints. Grossman still draining bloody urine. Cr down. Impression:   -Acute Kidney Injury: likely obstructive nephropathy. Improving on IVF and s/p grossman. Cr 7.0=>5.5 today.  Recent baseline Cr 1.2.  -Hyponatremia, mild: likely hypovolumic, improving   -Gross hematuria   -Anemia: Hb 13.5=>11.2, ?hematuria   -Leucocytosis : possible UTI   -Bladder tumour s/p TURB 10/14    Plan:   -Cont IVF hydration  -Monitor H/H  -IV abx, awaiting final urine culture result  -Keep grossman for now, ?urology consult   -Avoid ACEi, ARB, NSAIDs or IV dye for now  -No urgent indication for RRT currnetly    Medications:     Current Facility-Administered Medications:     acetaminophen (TYLENOL) tablet 650 mg, 650 mg, Oral, Q6H PRN, Salo Muñoz MD, 650 mg at 10/20/21 0818    atorvastatin (LIPITOR) tablet 10 mg, 10 mg, Oral, DAILY, Amber Fisher MD, 10 mg at 10/20/21 0818    cefTRIAXone (ROCEPHIN) 1 g in sterile water (preservative free) 10 mL IV syringe, 1 g, IntraVENous, Q24H, Amber Fisher MD    0.9% sodium chloride infusion, 100 mL/hr, IntraVENous, CONTINUOUS, Amber Fisher MD, Last Rate: 100 mL/hr at 10/20/21 0415, 100 mL/hr at 10/20/21 0415      Physical Assessment:     Visit Vitals  /78 (BP 1 Location: Left upper arm, BP Patient Position: At rest;Lying)   Pulse 73   Temp 97.6 °F (36.4 °C)   Resp 16   Ht 5' 9\" (1.753 m)   Wt 62.6 kg (138 lb)   SpO2 97%   BMI 20.38 kg/m²       Intake/Output Summary (Last 24 hours) at 10/20/2021 1142  Last data filed at 10/20/2021 0700  Gross per 24 hour   Intake 1448.33 ml   Output 1825 ml   Net -376.67 ml       General Appearance: no pain, no distress  Head: atraumatic  HEENT: no jaundice, moist oral mucosa  Neck: no JVD noted  Chest: LS clear to auscultation bilaterally  Heart: S1/S2, no murmur, gallop or rub, RRR  Adbomen: soft, nontender, BS active   : no flank tenderness, + grossman catheter w/ bloody urine   Skin: intact, no rash  Extremity: no edema, cyanosis or clubbing  MS: No joint deformity or tenderness  Neuro: conscious, alert, oriented x 3, no focal deficit    Usha Batres MD    Work Number: [id number]

## 2021-10-21 ENCOUNTER — APPOINTMENT (OUTPATIENT)
Dept: NON INVASIVE DIAGNOSTICS | Age: 86
DRG: 683 | End: 2021-10-21
Attending: HOSPITALIST
Payer: MEDICARE

## 2021-10-21 LAB
ANION GAP SERPL CALC-SCNC: 8 MMOL/L (ref 3–18)
BUN SERPL-MCNC: 70 MG/DL (ref 7–18)
BUN/CREAT SERPL: 19 (ref 12–20)
CALCIUM SERPL-MCNC: 7.4 MG/DL (ref 8.5–10.1)
CHLORIDE SERPL-SCNC: 111 MMOL/L (ref 100–111)
CO2 SERPL-SCNC: 20 MMOL/L (ref 21–32)
CREAT SERPL-MCNC: 3.67 MG/DL (ref 0.6–1.3)
ECHO AO ROOT DIAM: 2.18 CM
ECHO AV AREA PEAK VELOCITY: 2.5 CM2
ECHO AV AREA VTI: 2.26 CM2
ECHO AV AREA/BSA PEAK VELOCITY: 1.4 CM2/M2
ECHO AV AREA/BSA VTI: 1.3 CM2/M2
ECHO AV MEAN GRADIENT: 3.21 MMHG
ECHO AV PEAK GRADIENT: 6.24 MMHG
ECHO AV PEAK VELOCITY: 124.54 CM/S
ECHO AV VTI: 26.23 CM
ECHO IVC PROX: 1.96 CM
ECHO LA AREA 4C: 14.19 CM2
ECHO LA MAJOR AXIS: 3.17 CM
ECHO LA MINOR AXIS: 1.8 CM
ECHO LA VOL 2C: 30.93 ML (ref 18–58)
ECHO LA VOL 4C: 30.72 ML (ref 18–58)
ECHO LA VOL BP: 33.2 ML (ref 18–58)
ECHO LA VOL/BSA BIPLANE: 18.86 ML/M2 (ref 16–28)
ECHO LA VOLUME INDEX A2C: 17.57 ML/M2 (ref 16–28)
ECHO LA VOLUME INDEX A4C: 17.45 ML/M2 (ref 16–28)
ECHO LV E' LATERAL VELOCITY: 8.96 CM/S
ECHO LV E' SEPTAL VELOCITY: 6.01 CM/S
ECHO LV EDV A2C: 91.52 ML
ECHO LV EDV A4C: 42.08 ML
ECHO LV EDV BP: 68.28 ML (ref 67–155)
ECHO LV EDV INDEX A4C: 23.9 ML/M2
ECHO LV EDV INDEX BP: 38.8 ML/M2
ECHO LV EDV NDEX A2C: 52 ML/M2
ECHO LV EJECTION FRACTION A2C: 50 PERCENT
ECHO LV EJECTION FRACTION A4C: 59 PERCENT
ECHO LV EJECTION FRACTION BIPLANE: 57.4 PERCENT (ref 55–100)
ECHO LV ESV A2C: 45.5 ML
ECHO LV ESV A4C: 17.16 ML
ECHO LV ESV BP: 29.11 ML (ref 22–58)
ECHO LV ESV INDEX A2C: 25.9 ML/M2
ECHO LV ESV INDEX A4C: 9.8 ML/M2
ECHO LV ESV INDEX BP: 16.5 ML/M2
ECHO LV GLOBAL LONGITUDINAL STRAIN (GLS): -14.4 PERCENT
ECHO LV INTERNAL DIMENSION DIASTOLIC: 3.91 CM (ref 4.2–5.9)
ECHO LV INTERNAL DIMENSION SYSTOLIC: 2.58 CM
ECHO LV IVSD: 0.58 CM (ref 0.6–1)
ECHO LV MASS 2D: 67.9 G (ref 88–224)
ECHO LV MASS INDEX 2D: 38.6 G/M2 (ref 49–115)
ECHO LV POSTERIOR WALL DIASTOLIC: 0.71 CM (ref 0.6–1)
ECHO LVOT CARDIAC OUTPUT: 3.35 LITER/MINUTE
ECHO LVOT DIAM: 2 CM
ECHO LVOT PEAK GRADIENT: 3.93 MMHG
ECHO LVOT PEAK VELOCITY: 99.17 CM/S
ECHO LVOT SV: 46 ML
ECHO LVOT SV: 59.4 ML
ECHO LVOT VTI: 18.93 CM
ECHO MV A VELOCITY: 131.99 CM/S
ECHO MV E DECELERATION TIME (DT): 218.92 MS
ECHO MV E VELOCITY: 94.78 CM/S
ECHO MV E/A RATIO: 0.72
ECHO MV E/E' LATERAL: 10.58
ECHO MV E/E' RATIO (AVERAGED): 13.17
ECHO MV E/E' SEPTAL: 15.77
ECHO PV REGURGITANT END DIASTOLIC MAX VELOCITY: 129.61 CM/S
ECHO RA AREA 4C: 15.63 CM2
ECHO RV INTERNAL DIMENSION: 3.46 CM
ECHO RV TAPSE: 2.51 CM (ref 1.5–2)
ERYTHROCYTE [DISTWIDTH] IN BLOOD BY AUTOMATED COUNT: 13.2 % (ref 11.6–14.5)
GLOBAL LONGITUDINAL STRAIN 2 CHAMBER: -17.5 PERCENT
GLOBAL LONGITUDINAL STRAIN 4 CHAMBER: -14.1 PERCENT
GLOBAL LONGITUDINAL STRAIN LONG AXIS: -11.7 PERCENT
GLUCOSE SERPL-MCNC: 100 MG/DL (ref 74–99)
HCT VFR BLD AUTO: 30.2 % (ref 36–48)
HGB BLD-MCNC: 10.7 G/DL (ref 13–16)
LA VOL DISK BP: 31.68 ML (ref 18–58)
LVOT MG: 1.9 MMHG
MAGNESIUM SERPL-MCNC: 2.1 MG/DL (ref 1.6–2.6)
MCH RBC QN AUTO: 36.1 PG (ref 24–34)
MCHC RBC AUTO-ENTMCNC: 35.4 G/DL (ref 31–37)
MCV RBC AUTO: 102 FL (ref 78–100)
PLATELET # BLD AUTO: 235 K/UL (ref 135–420)
PMV BLD AUTO: 9.8 FL (ref 9.2–11.8)
POTASSIUM SERPL-SCNC: 4.4 MMOL/L (ref 3.5–5.5)
RBC # BLD AUTO: 2.96 M/UL (ref 4.35–5.65)
SODIUM SERPL-SCNC: 139 MMOL/L (ref 136–145)
WBC # BLD AUTO: 9 K/UL (ref 4.6–13.2)

## 2021-10-21 PROCEDURE — 85027 COMPLETE CBC AUTOMATED: CPT

## 2021-10-21 PROCEDURE — 74011250637 HC RX REV CODE- 250/637: Performed by: INTERNAL MEDICINE

## 2021-10-21 PROCEDURE — 83735 ASSAY OF MAGNESIUM: CPT

## 2021-10-21 PROCEDURE — 74011250636 HC RX REV CODE- 250/636: Performed by: HOSPITALIST

## 2021-10-21 PROCEDURE — 74011250637 HC RX REV CODE- 250/637: Performed by: HOSPITALIST

## 2021-10-21 PROCEDURE — 74011250637 HC RX REV CODE- 250/637: Performed by: UROLOGY

## 2021-10-21 PROCEDURE — 97162 PT EVAL MOD COMPLEX 30 MIN: CPT

## 2021-10-21 PROCEDURE — 36415 COLL VENOUS BLD VENIPUNCTURE: CPT

## 2021-10-21 PROCEDURE — 93306 TTE W/DOPPLER COMPLETE: CPT

## 2021-10-21 PROCEDURE — 80048 BASIC METABOLIC PNL TOTAL CA: CPT

## 2021-10-21 PROCEDURE — 65270000029 HC RM PRIVATE

## 2021-10-21 PROCEDURE — 97116 GAIT TRAINING THERAPY: CPT

## 2021-10-21 PROCEDURE — 74011000250 HC RX REV CODE- 250: Performed by: HOSPITALIST

## 2021-10-21 RX ORDER — TAMSULOSIN HYDROCHLORIDE 0.4 MG/1
0.4 CAPSULE ORAL DAILY
Status: DISCONTINUED | OUTPATIENT
Start: 2021-10-21 | End: 2021-10-23 | Stop reason: HOSPADM

## 2021-10-21 RX ORDER — ADHESIVE BANDAGE
30 BANDAGE TOPICAL DAILY
Status: DISCONTINUED | OUTPATIENT
Start: 2021-10-21 | End: 2021-10-23 | Stop reason: HOSPADM

## 2021-10-21 RX ORDER — FUROSEMIDE 10 MG/ML
40 INJECTION INTRAMUSCULAR; INTRAVENOUS ONCE
Status: COMPLETED | OUTPATIENT
Start: 2021-10-21 | End: 2021-10-21

## 2021-10-21 RX ADMIN — WATER 1 G: 1 INJECTION INTRAMUSCULAR; INTRAVENOUS; SUBCUTANEOUS at 13:23

## 2021-10-21 RX ADMIN — FUROSEMIDE 40 MG: 10 INJECTION, SOLUTION INTRAMUSCULAR; INTRAVENOUS at 18:27

## 2021-10-21 RX ADMIN — SODIUM CHLORIDE 100 ML/HR: 900 INJECTION, SOLUTION INTRAVENOUS at 13:23

## 2021-10-21 RX ADMIN — ACETAMINOPHEN 650 MG: 325 TABLET ORAL at 09:36

## 2021-10-21 RX ADMIN — TAMSULOSIN HYDROCHLORIDE 0.4 MG: 0.4 CAPSULE ORAL at 09:37

## 2021-10-21 RX ADMIN — ATORVASTATIN CALCIUM 10 MG: 10 TABLET, FILM COATED ORAL at 09:37

## 2021-10-21 NOTE — CONSULTS
[facility] Consult Note      Patient: Javier Salmeron MRN: [id number]  SSN: [id number]    YOB: 1933  Age: 80 y.o. Sex: male    Date of Consultation: 10/21/2021  Referring Physician: Regi Owen MD  Reason for Consultation: Wide QRS complex tachycardia    Chief complain: Abdominal pain    HPI:  44-year-old gentleman brought to emergency room from primary care provider's office with abdominal discomfort. Patient had bladder tumor and underwent resection on 10/14/2021. He was not passing urine since then. He was seen by his primary care provider on 10/19/2021 and sent to the emergency room. Hood catheter was placed for urinary obstruction. Cardiology consult call for monitor revealed wide QRS complex tachycardia. Patient denies any palpitation. He denies any episode of palpitation at home. He denies any chest pain or unusual shortness of breath on exertion. He denies any orthopnea or PND. He denies any dizziness, presyncope or syncope. He denies any smoking or alcohol abuse.     Past Medical History:   Diagnosis Date    Bladder tumor     Hypercholesteremia     Impaired functional mobility, balance, and endurance      Past Surgical History:   Procedure Laterality Date    HX CATARACT REMOVAL Bilateral     HX CERVICAL DISKECTOMY  2000    HX COLONOSCOPY       Current Facility-Administered Medications   Medication Dose Route Frequency    tamsulosin (FLOMAX) capsule 0.4 mg  0.4 mg Oral DAILY    magnesium hydroxide (MILK OF MAGNESIA) 400 mg/5 mL oral suspension 30 mL  30 mL Oral DAILY    influenza vaccine 2021-22 (6 mos+)(PF) (FLUARIX/FLULAVAL/FLUZONE QUAD) injection 0.5 mL  1 Each IntraMUSCular PRIOR TO DISCHARGE    acetaminophen (TYLENOL) tablet 650 mg  650 mg Oral Q6H PRN    atorvastatin (LIPITOR) tablet 10 mg  10 mg Oral DAILY    cefTRIAXone (ROCEPHIN) 1 g in sterile water (preservative free) 10 mL IV syringe  1 g IntraVENous Q24H    0.9% sodium chloride infusion  100 mL/hr IntraVENous CONTINUOUS       Allergies and Intolerances: Allergies   Allergen Reactions    Sulfa (Sulfonamide Antibiotics) Anaphylaxis       Family History:   No family history on file. Social History:   He  reports that he has never smoked. He has never used smokeless tobacco.  He  reports current alcohol use of about 7.0 standard drinks of alcohol per week. Review of Systems:     Gen: No fever, chills, malaise, weight loss/gain. Heent: No headache, rhinorrhea, epistaxis, ear pain, hearing loss, sinus pain, neck pain/stiffness, sore throat. Heart: No chest pain, palpitations,   Shortness of breath, pnd, or orthopnea. Resp: No cough, hemoptysis, wheezing and  Dyspnea  GI: No nausea, vomiting, diarrhea, constipation, melena or hematochezia. :  positive urinary obstruction,  No dysuria,  positive hematuria. Derm: No rash, new skin lesion or pruritis. Musc/skeletal: no bone or joint complains. Vasc: No edema, cyanosis or claudication. Endo: No heat/cold intolerance, no polyuria,polydipsia or polyphagia. Neuro: No unilateral weakness, numbness, tingling. No seizures. Heme: No easy bruising or bleeding. Physical:   Patient Vitals for the past 6 hrs:   Temp Pulse Resp BP SpO2   10/21/21 1538 98 °F (36.7 °C) 66 16 (!) 175/82 99 %   10/21/21 1142    (!) 162/75    10/21/21 1118 97.4 °F (36.3 °C) 63 16 (!) 162/75 99 %         Exam:   General Appearance: Comfortable, not using accessory muscles of respiration. HEENT: LISA. HEAD: Atraumatic  NECK: No JVD, no thyroidomeglay. CAROTIDS: no bruit  LUNGS: Clear bilaterally. HEART: S1+S2 audible, no murmur, no pericardial rub. ABD: Non-tender, BS Audible    EXT: No edema, and no cyanosis. VASCULAR EXAM: Pulses are intact. PSYCHIATRIC EXAM: Mood is appropriate. MUSCULOSKELETAL: Grossly no joint deformity.   NEUROLOGICAL: AAO times 3, Motor and sensory sytem intact    Review of Data:   LABS:   Lab Results   Component Value Date/Time    WBC 9.0 10/21/2021 02:45 AM    HGB 10.7 (L) 10/21/2021 02:45 AM    HCT 30.2 (L) 10/21/2021 02:45 AM    PLATELET 394 73/03/0481 02:45 AM     Lab Results   Component Value Date/Time    Sodium 139 10/21/2021 02:45 AM    Potassium 4.4 10/21/2021 02:45 AM    Chloride 111 10/21/2021 02:45 AM    CO2 20 (L) 10/21/2021 02:45 AM    Glucose 100 (H) 10/21/2021 02:45 AM    BUN 70 (H) 10/21/2021 02:45 AM    Creatinine 3.67 (H) 10/21/2021 02:45 AM     No results found for: CHOL, CHOLX, CHLST, CHOLV, HDL, HDLP, LDL, LDLC, DLDLP, TGLX, TRIGL, TRIGP  No components found for: GPT  No results found for: HBA1C, [id number], NER0QDAB      Cardiology Procedures:   Results for orders placed or performed during the hospital encounter of 10/19/21   EKG, 12 LEAD, INITIAL   Result Value Ref Range    Ventricular Rate 135 BPM    Atrial Rate 77 BPM    P-R Interval 180 ms    QRS Duration 108 ms    Q-T Interval 384 ms    QTC Calculation (Bezet) 576 ms    Calculated P Axis 74 degrees    Calculated R Axis -44 degrees    Calculated T Axis 101 degrees    Diagnosis       Poor data quality, interpretation may be adversely affected  Sinus rhythm  Left axis deviation  Left bundle branch block  Abnormal ECG  Confirmed by Helen Montano MD, Alexander Macario ([id number]) on 10/19/2021 10:40:02 PM             Impression / Plan:    Patient Active Problem List   Diagnosis Code    Acute renal failure (ARF) ([facility] [address] N17.9    Bladder tumor D49.4    Hematuria R31.9    Hyponatremia E87.1    Urinary obstruction N13.9    UTI (urinary tract infection) N39.0     Wide QRS complex tachycardia    Patient had LHC, RHC and coronary angiogram done in 10/2020 reported    1. Coronaries: Normal coronary arteries. 2. Left ventricle: Normal left ventricular systolic function. 3. Normal right atrial pressure.     4. Normal pulmonary capillary wedge pressure. 5. Normal pulmonary artery pressures. 6. Normal thermodilution cardiac output and cardiac index.        Echocardiogram revealed     · Left Ventricle: Normal cavity size, wall thickness and systolic function (ejection fraction normal). The estimated EF is 55 - 60%. There is mild (grade 1) left ventricular diastolic dysfunction E/E' ratio = 13.17. Wall Scoring: The left ventricular wall motion is normal.  · Tricuspid Valve: Normal valve structure and no stenosis. Tricuspid regurgitation is inadequate for estimation of right ventricular systolic pressure. 80-year-old gentleman status post bladder tumor resection being managed for urinary obstruction status post Hood catheter placement. He was in acute renal failure. Creatinine is improving. No significant electrolyte abnormality. Tele monitor reviewed. Tele monitor revealed wide QRS complex tachycardia at 150 beats per minute for 42 seconds. Patient has underlying left bundle branch block. Differential diagnosis is SVT with underlying left bundle versus nonsustained ventricular tachycardia. LVEF is normal.      Plan:    Monitor electrolytes. Continue telemetry monitoring. Will follow-up.     Plan discussed with     Signed By: Froylan Bray MD     October 21, 2021

## 2021-10-21 NOTE — PROGRESS NOTES
Problem: Falls - Risk of  Goal: *Absence of Falls  Description: Document Flaco Gibbs Fall Risk and appropriate interventions in the flowsheet. Outcome: Progressing Towards Goal  Note: Fall Risk Interventions:  Mobility Interventions: Bed/chair exit alarm, Patient to call before getting OOB         Medication Interventions: Evaluate medications/consider consulting pharmacy, Bed/chair exit alarm, Patient to call before getting OOB    Elimination Interventions: Bed/chair exit alarm, Patient to call for help with toileting needs              Problem: Patient Education: Go to Patient Education Activity  Goal: Patient/Family Education  Outcome: Progressing Towards Goal     Problem: Pressure Injury - Risk of  Goal: *Prevention of pressure injury  Description: Document Kamran Scale and appropriate interventions in the flowsheet.   Outcome: Progressing Towards Goal  Note: Pressure Injury Interventions:  Sensory Interventions: Assess changes in LOC    Moisture Interventions: Absorbent underpads    Activity Interventions: Pressure redistribution bed/mattress(bed type)    Mobility Interventions: Pressure redistribution bed/mattress (bed type)    Nutrition Interventions: Document food/fluid/supplement intake    Friction and Shear Interventions: HOB 30 degrees or less                Problem: Patient Education: Go to Patient Education Activity  Goal: Patient/Family Education  Outcome: Progressing Towards Goal

## 2021-10-21 NOTE — PROGRESS NOTES
Problem: Mobility Impaired (Adult and Pediatric)  Goal: *Acute Goals and Plan of Care (Insert Text)  Description: Physical Therapy Goals   Initiated 10/21/2021 and to be accomplished within 5-7 day(s)  1. Patient will move from supine <> sit with S in prep for out of bed activity and change of position. 2.  Patient will perform sit<> stand with S with RW in prep for transfers/ambulation. 3.  Patient will transfer from bed <> chair with S with RW for time up in chair for completion of ADL activity. 4.  Patient will ambulate 150 feet with S/RW for improved functional mobility at discharge. 5.  Patient will ascend/descend 3-5 stairs with handrail(s) with minimal assistance/contact guard assist for home re-entry as needed. Outcome: Progressing Towards Goal  PHYSICAL THERAPY EVALUATION    Patient: Carrie Erwin [de-identified]80 y.o. male)  Date: 10/21/2021  Primary Diagnosis: Acute renal failure (ARF) ([facility]) [N17.9]  Precautions:   Fall  PLOF:amb w/o AD unless using bathroom for urgent voiding, then uses rollator. Lives alone in [facility], 5 ROSS with BHR's, and has private caregivers daily 11a-5/6p. ASSESSMENT :  Based on the objective data described below, the patient is seen on medical unit. Pt found supine in bed and nurses present attending to catheter and CBI to allow ambulation. Pt presents with ROM/motor performance grossly decr'd but functional, with decr'd independence in functional mobility, including ambulation, with dec'd dynamic standing balance and decreased activity tolerance. Patient reports no pain present. Demonstrates transition to sit EOB with SBA/S; /56, HR 76. Pt STS with RW/CGA x 2 trials; /90, HR 83. Nurse MALIKA ZUNIGA [facility] present; pt w/o c/o and okayed to continue with GT. Participates with GT/CGA/min assist ~66ft. Ximena fluctuates, step length short, and 2 episodes of decr'd balance noted, leading to steps outside NOREEN to recover.   Pt returned to room to sit EOB, at which point pt did confirm feeling dizzy while in ibanez and SOB. BP EOB post /92, HR 81, O2 sat 98% on RA. Pt left back supine in bed with all needs in reach. /82, HR 64; symptoms resolving. Pt educated in PT POC and importance of increasing mobility as tolerated, as well as need for staff assist at all times when up. Recommend HHPT vs SNF for follow up physical therapy upon discharge (pending progress), to reach maximal level of independence/safety with functional mobility. Patient will benefit from skilled intervention to address the above impairments. Pt Education: Role of physical therapy in acute care setting, fall prevention and safety/technique during functional mobility tasks    Patient's rehabilitation potential is considered to be Fair  Factors which may influence rehabilitation potential include:   []         None noted  []         Mental ability/status  [x]         Medical condition  [x]         Home/family situation and support systems  []         Safety awareness  []         Pain tolerance/management  []         Other:      PLAN :  Recommendations and Planned Interventions:   [x]           Bed Mobility Training             []    Neuromuscular Re-Education  [x]           Transfer Training                   []    Orthotic/Prosthetic Training  [x]           Gait Training                          []    Modalities  [x]           Therapeutic Exercises           []    Edema Management/Control  [x]           Therapeutic Activities            []    Family Training/Education  [x]           Patient Education  []           Other (comment):    Frequency/Duration: Patient will be followed by physical therapy 1-2 times per day/4-7 days per week to address goals. Discharge Recommendations: Home Physical Therapy vs Skilled Nursing Facility  Further Equipment Recommendations for Discharge: N/A     SUBJECTIVE:   Patient stated I feel good.     OBJECTIVE DATA SUMMARY:     Past Medical History:   Diagnosis Date  Bladder tumor     Hypercholesteremia     Impaired functional mobility, balance, and endurance      Past Surgical History:   Procedure Laterality Date    HX CATARACT REMOVAL Bilateral     HX CERVICAL DISKECTOMY  2000    HX COLONOSCOPY       Barriers to Learning/Limitations: yes;  sensory deficits-vision/hearing/speech and physical  Compensate with: Visual Cues, Verbal Cues and Tactile Cues  Home Situation:  Home Situation  Home Environment: Private residence  # Steps to Enter: 5  Rails to Enter: Yes  Hand Rails : Bilateral (far apart)  One/Two Story Residence: One story  Living Alone: Yes  Support Systems: Caregiver/Home Care Staff (private caregivers 11a-5/6p, 7 day w/k)  Patient Expects to be Discharged to[de-identified] House  Current DME Used/Available at Home: Hasmukh Moros, rollator  Tub or Shower Type: Shower (with seat)  Critical Behavior:  Neurologic State: Alert; Appropriate for age  Orientation Level: Oriented X4  Cognition: Follows commands  Safety/Judgement: Fall prevention; Awareness of environment  Psychosocial  Patient Behaviors: Calm; Cooperative  Purposeful Interaction: Yes  Pt Identified Daily Priority: Clinical issues (comment)  Caritas Process: Nurture loving kindness; Attend basic human needs; Enable ernie/hope;Establish trust;Teaching/learning;Create healing environment  Caring Interventions: Reassure; Therapeutic modalities  Reassure: Caring rounds  Therapeutic Modalities: Intentional therapeutic touch  Strength:    Strength: Generally decreased, functional  Tone & Sensation:   Sensation: Intact  Range Of Motion:  AROM: Generally decreased, functional  Functional Mobility:  Bed Mobility:  Supine to Sit: Supervision;Stand-by assistance  Sit to Supine: Stand-by assistance  Transfers:  Sit to Stand: Contact guard assistance  Stand to Sit: Contact guard assistance  Balance:   Sitting: Intact  Standing: Impaired; With support  Standing - Static: Fair  Standing - Dynamic : Fair  Ambulation/Gait Training:  Distance (ft): 66 Feet (ft)  Assistive Device: Walker, rolling  Ambulation - Level of Assistance: Minimal assistance;Contact guard assistance  Gait Abnormalities: Decreased step clearance; Path deviations  Speed/Ximena: Fluctuations  Step Length: Left shortened;Right shortened  Interventions: Safety awareness training;Verbal cues  Pain:  Pain level pre-treatment: 0/10   Pain level post-treatment: 0/10   Pain Intervention(s) : Medication (see MAR); Rest, Ice, Repositioning  Response to intervention: Nurse notified, See doc flow  Activity Tolerance:   Fair   Please refer to the flowsheet for vital signs taken during this treatment. After treatment:   []         Patient left in no apparent distress sitting up in chair  [x]         Patient left in no apparent distress in bed  [x]         Call bell left within reach  [x]         Nursing notified  []         Caregiver present  []         Bed alarm activated  []         SCDs applied    COMMUNICATION/EDUCATION:   [x]         Role of Physical Therapy in the acute care setting. [x]         Fall prevention education was provided and the patient/caregiver indicated understanding. [x]         Patient/family have participated as able in goal setting and plan of care. [x]         Patient/family agree to work toward stated goals and plan of care. []         Patient understands intent and goals of therapy, but is neutral about his/her participation. []         Patient is unable to participate in goal setting/plan of care: ongoing with therapy staff.  []         Other:     Thank you for this referral.  Brooke Hansen, PT   Time Calculation: 31 mins      Eval Complexity: History: HIGH Complexity :3+ comorbidities / personal factors will impact the outcome/ POC Exam:MEDIUM Complexity : 3 Standardized tests and measures addressing body structure, function, activity limitation and / or participation in recreation  Presentation: MEDIUM Complexity : Evolving with changing characteristics Clinical Decision Making:Medium Complexity    Overall Complexity:MEDIUM

## 2021-10-21 NOTE — PROGRESS NOTES
Nephrology Progress Note        HPI:   Mr Orion Montalvo is 81 yo wm with PMHX of HLD and recent Dx of bladder tumour who was sent to the ER from his PCP office due to hypotension. Pt had recent TURB done on 10/14 for new dx of bladder tumour. After his recent procedure pt was not passing urine as much. He had some lower abd pain but denies fever, CP, SOB N/V. In the ER BP was normal and afebrile. Labs showed BUN/Cr 92/7.0 from 15/1.2 about 3 wks ago. In the ER grossman cath was placed and had ~700cc of bloody urine. Currently w/o complaints. No hx of HTN, DM or known cardiac disease.     -Pt w/o new complaints. Grossman still draining bloody urine. Cr down, 7=>3.6. Tremendous UOP    Impression:   -Acute Kidney Injury: likely obstructive nephropathy. Improving on IVF and s/p grossman. Cr 7.0=>3.6 today.  Recent baseline Cr 1.2.  -Hyponatremia, mild: likely hypovolumic, improving   -Gross hematuria   -Anemia: Hb 13.5=>11.2, ?hematuria   -Leucocytosis : possible UTI   -Bladder tumour s/p TURB 10/14    Plan:   -Cont IVF hydration  -Monitor H/H  -IV abx, awaiting final urine culture result  -Keep grossman for now, ?urology consult   -Avoid ACEi, ARB, NSAIDs or IV dye for now  -No urgent indication for RRT currently    Medications:     Current Facility-Administered Medications:     tamsulosin (FLOMAX) capsule 0.4 mg, 0.4 mg, Oral, DAILY, Peggy Fonseca MD, 0.4 mg at 10/21/21 2628    magnesium hydroxide (MILK OF MAGNESIA) 400 mg/5 mL oral suspension 30 mL, 30 mL, Oral, DAILY, Peggy Fonseca MD    acetaminophen (TYLENOL) tablet 650 mg, 650 mg, Oral, Q6H PRN, Mary Muñoz MD, 650 mg at 10/21/21 0936    atorvastatin (LIPITOR) tablet 10 mg, 10 mg, Oral, DAILY, Amber Fisher MD, 10 mg at 10/21/21 1807    cefTRIAXone (ROCEPHIN) 1 g in sterile water (preservative free) 10 mL IV syringe, 1 g, IntraVENous, Q24H, Amber Fisher MD, 1 g at 10/20/21 1356    0.9% sodium chloride infusion, 100 mL/hr, IntraVENous, CONTINUOUS, Phillip, Charlotte Oropeza MD, Last Rate: 100 mL/hr at 10/20/21 1719, 100 mL/hr at 10/20/21 1719      Physical Assessment:     Visit Vitals  BP (!) 154/70   Pulse 64   Temp 98.2 °F (36.8 °C)   Resp 16   Ht 5' 9\" (1.753 m)   Wt 62.6 kg (138 lb)   SpO2 98%   BMI 20.38 kg/m²       Intake/Output Summary (Last 24 hours) at 10/21/2021 0940  Last data filed at 10/21/2021 0630  Gross per 24 hour   Intake 4000 ml   Output 98937 ml   Net -49521 ml       General Appearance: no pain, no distress  Head: atraumatic  HEENT: no jaundice, moist oral mucosa  Neck: no JVD noted  Chest: LS clear to auscultation bilaterally  Heart: S1/S2, no murmur, gallop or rub, RRR  Adbomen: soft, nontender, BS active   : no flank tenderness, + grossman catheter w/ bloody urine   Skin: intact, no rash  Extremity: no edema, cyanosis or clubbing  MS: No joint deformity or tenderness  Neuro: conscious, alert, oriented x 3, no focal deficit    Vin Callejas MD    Work Number: [id number]

## 2021-10-21 NOTE — CONSULTS
[facility]        Patient: Shari Yang MRN: [id number]  SSN: [id number]    YOB: 1933  Age: 80 y.o. Sex: male          Subjective:     Date of Consultation:  October 21, 2021    Referring Physician: Delmis Fink     Reason for Consultation:  Gross hematuria, acute urinary retention    History of Present Illness:   Patient is a 80 y.o.  male who is being seen for acute urinary retention and gross hematuria. Irmaraul Leon He was admitted to the hospital for Acute renal failure ([facility]) ([facility] [N17.9]. Patient is 80year-old patient well-known to me with a history of hematuria. Work-up revealed bladder mass he underwent transurethral resection of bladder tumor on 10/14/2021. Postoperatively he did well Hood catheter was removed. According to patient he was then having problems voiding and was seen by his family physician on Tuesday, 10/19/2021. At that time he was sent to the emergency room for further evaluation. In the emergency room he was noted to have urinary retention and obstructive uropathy. Hood catheter was placed at that time bladder emptied and then he began having gross hematuria. He was placed on continuous bladder irrigation yesterday. Renal ultrasound performed:10/20/21  1. Mild bilateral hydronephrosis. 2. Indwelling Hood catheter with retention balloon present in the urinary  bladder.     > Likely small quantity of intravesicular clot present. In addition his hematocrit had dropped from 39.6% and it is now 30.2%. He is not symptomatic. His creatinine is improving. It was 7.0 at the time of admission which is up from his baseline of 1.23. It is now subsequently slowly dropping it is at 3.67. He is not having any significant complaints at this time although he has not been ambulating and has not had a bowel movement.   Past Medical History:   Diagnosis Date    Bladder tumor     Hypercholesteremia     Impaired functional mobility, balance, and endurance Past Surgical History:   Procedure Laterality Date    HX CATARACT REMOVAL Bilateral     HX CERVICAL DISKECTOMY      HX COLONOSCOPY        No family history on file. Social History     Tobacco Use    Smoking status: Never Smoker    Smokeless tobacco: Never Used   Substance Use Topics    Alcohol use: Yes     Alcohol/week: 7.0 standard drinks     Types: 7 Shots of liquor per week     Allergies   Allergen Reactions    Sulfa (Sulfonamide Antibiotics) Anaphylaxis      Prior to Admission medications    Medication Sig Start Date End Date Taking? Authorizing Provider   aspirin delayed-release 81 mg tablet Take 162 mg by mouth daily. Provider, Historical   simvastatin (ZOCOR) 20 mg tablet Take 20 mg by mouth daily. Provider, Historical   cyanocobalamin, vitamin B-12, (VITAMIN B-12 PO) Take  by mouth daily. Provider, Historical   zinc gluconate 30 mg tab Take  by mouth daily. Provider, Historical   iron fum-FA-B complex-C-mins 29 mg iron- 400 mcg tab Take  by mouth daily. Provider, Historical         Objective:     Patient Vitals for the past 8 hrs:   BP Temp Pulse Resp SpO2   10/21/21 0311 (!) 159/83 98.2 °F (36.8 °C) 68 16 98 %   10/21/21 0038 (!) 158/91 97.4 °F (36.3 °C) 76 16 97 %     Temp (24hrs), Av.9 °F (36.6 °C), Min:97.4 °F (36.3 °C), Max:98.3 °F (36.8 °C)  Intake and Output:   10/19 0701 - 10/20 1900  In: 1448.3 [P.O.:240; I.V.:1208.3]  Out: 87046 [Urine:89323]    Physical Exam:  Constitutional  well-nourished well-developed male in no distress   Abdomen  soft nontender nondistended no suprapubic distention   Genitourinary  phallus uncircumcised Hood catheter in place scrotum normal testes normal   Extremity No Edema. Neurological Level of consciousness - Normal. Orientation - Normal.   Psychiatric Oriented to time, place, person and situation. Appropriate mood and affect.      Lab/Data Reviewed:  BMP:   Lab Results   Component Value Date/Time     10/21/2021 02:45 AM    K 4.4 10/21/2021 02:45 AM     10/21/2021 02:45 AM    CO2 20 (L) 10/21/2021 02:45 AM    AGAP 8 10/21/2021 02:45 AM     (H) 10/21/2021 02:45 AM    BUN 70 (H) 10/21/2021 02:45 AM    CREA 3.67 (H) 10/21/2021 02:45 AM    GFRAA 19 (L) 10/21/2021 02:45 AM    GFRNA 16 (L) 10/21/2021 02:45 AM     CBC:   Lab Results   Component Value Date/Time    WBC 9.0 10/21/2021 02:45 AM    HGB 10.7 (L) 10/21/2021 02:45 AM    HCT 30.2 (L) 10/21/2021 02:45 AM     10/21/2021 02:45 AM       Assessment:     Active Problems:    Acute renal failure (ARF) ([facility] (10/19/2021)      Bladder tumor ()      Hematuria (10/19/2021)      Hyponatremia (10/19/2021)      Urinary obstruction (10/19/2021)      UTI (urinary tract infection) (10/19/2021)        Impression: 79-year-old male with resolving obstructive uropathy, which is improving. Bilateral hydronephrosis caused from obstructive uropathy. Gross hematuria which is now resolved. Plan:     1. Continue Hood with slow drip CBI  2. Begin Flomax 0.4 mg daily  3. Milk of magnesia for constipation  4. Physical therapy for ambulation  5. If patient is able to ambulate and has bowel movement he should be able to go home tomorrow with Hood catheter in place and voiding trial in my office on Monday    Time spent consulting and reviewing findings with patient 38 minutes.   Greater than 50% spent counseling patient and coordinating care    Signed By: Gurjit Moctezuma MD                         October 21, 2021

## 2021-10-21 NOTE — PROGRESS NOTES
Hospitalist Progress Note    Patient: Shari Yang MRN: [id number]  CSN: [id number]    YOB: 1933  Age: 80 y.o. Sex: male    DOA: 10/19/2021 LOS:  LOS: 2 days                Assessment/Plan     Patient Active Problem List   Diagnosis Code    Acute renal failure (ARF) ([facility] N17.9    Bladder tumor D49.4    Hematuria R31.9    Hyponatremia E87.1    Urinary obstruction N13.9    UTI (urinary tract infection) N39.0        Chief complaint :  80 y.o. male with bladder tumor, s/p TURB on 10/14/2021, hypercholesterolemia is sent to ER from his PCP office for hypotension. In ER he is noted to have elevated creatine at 7. Creatine 3 weeks ago in normal range. Urinary obstruction. No rhythm alarms since yesterday  Follow echo  Cardiology consulted     Acute kidney injury-  improving  Likely secondary to urinary obstruction. Nephrology following     Urinary obstruction-  S/p Hood  US with mild bilateral hydronephrosis. Seen by urology      Gross hematuria-  Hood in place, CBI      UTI-  On ceftriaxone,   Urine cultures no growth.       Hyponatremia-  resolved     Bladder tumor-  S/p bladder tumor resection on 10/14/2021. 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     DVT prophylaxis with SCD secondary to hematuria       Disposition : 1-2 days    Review of systems  General: No fevers or chills. Cardiovascular: No chest pain or pressure. No palpitations. Pulmonary: No shortness of breath. Gastrointestinal: No nausea, vomiting. Physical Exam:  General: Awake, cooperative, no acute distress    HEENT: NC, Atraumatic. PERRLA, anicteric sclerae. Lungs: CTA Bilaterally. No Wheezing/Rhonchi/Rales. Heart:  S1 S2,  No murmur, No Rubs, No Gallops  Abdomen: Soft, Non distended, Non tender.  +Bowel sounds,   Extremities: No c/c/e  Psych:   Not anxious or agitated. Neurologic:  No acute neurological deficit.          Vital signs/Intake and Output:  Visit Vitals  BP (!) 162/75   Pulse 63   Temp 97.4 °F (36.3 °C)   Resp 16   Ht 5' 9\" (1.753 m)   Wt 62.6 kg (138 lb)   SpO2 99%   BMI 20.38 kg/m²     Current Shift:  10/21 0701 - 10/21 1900  In: -   Out: 1100 [Urine:1100]  Last three shifts:  10/19 1901 - 10/21 0700  In: 5448.3 [P.O.:240; I.V.:1208.3]  Out: 21100 [Urine:21100]            Labs: Results:       Chemistry Recent Labs     10/21/21  0245 10/20/21  1946 10/20/21  0225 10/19/21  1243 10/19/21  1243   * 118* 90   < > 111*    137 133*   < > 128*   K 4.4 4.5 4.7   < > 4.5    107 102   < > 94*   CO2 20* 22 20*   < > 22   BUN 70* 76* 84*   < > 92*   CREA 3.67* 4.33* 5.50*   < > 7.00*   CA 7.4* 7.7* 7.6*   < > 8.8   AGAP 8 8 11   < > 12   BUCR 19 18 15   < > 13   AP  --   --   --   --  67   TP  --   --   --   --  7.6   ALB  --   --   --   --  3.4   GLOB  --   --   --   --  4.2*   AGRAT  --   --   --   --  0.8    < > = values in this interval not displayed. CBC w/Diff Recent Labs     10/21/21  0245 10/20/21  0225 10/19/21  1243   WBC 9.0 10.1 13.7*   RBC 2.96* 3.10* 3.70*   HGB 10.7* 11.2* 13.5   HCT 30.2* 32.2* 39.6    212 256   GRANS  --   --  77*   LYMPH  --   --  8*   EOS  --   --  0      Cardiac Enzymes Recent Labs     10/19/21  1243      CKND1 1.4      Coagulation No results for input(s): PTP, INR, APTT, INREXT, INREXT in the last 72 hours. Lipid Panel No results found for: CHOL, CHOLPOCT, CHOLX, CHLST, CHOLV, 673441, HDL, HDLP, LDL, LDLC, DLDLP, 825624, VLDLC, VLDL, TGLX, TRIGL, TRIGP, TGLPOCT, CHHD, CHHDX   BNP No results for input(s): BNPP in the last 72 hours.    Liver Enzymes Recent Labs     10/19/21  1243   TP 7.6   ALB 3.4   AP 67      Thyroid Studies No results found for: T4, T3U, TSH, TSHEXT, TSHEXT     Procedures/imaging: see electronic medical records for all procedures/Xrays and details which were not copied into this note but were reviewed prior to creation of Plan

## 2021-10-21 NOTE — PROGRESS NOTES
Verbal shift change  Received from Alicia Keys RN (outgoing nurse), to ISAIAH Zamudio (oncoming)  Pt. Is AOX 4. IV patent and infusing well, Pt. denies  pain at this time. Report included the following information SBAR, Kardex, Procedure Summary, Intake/Output, MAR, Recent Lab Results, and  Cardiac Rhythm @ SR. Will resume care and monitor Pt. Condition. Pt. Educated on call bell when in need of help and assistance. Pt. verbalied understanding. Bed alarm on.    1935  Pt. Head to toe Assessment Done and documented. 2035  Pt made no complaints. 2145  Pt. Denies discomfort. 2300  Pt. Made no complaints. 0000  Pt. Resting with eyes closed, easily awaken. 0200  Pt. Denies pain. 0400  Pt. Made no complaints. 0530  Pt. Able to rest and sleep well throughout the shift. 0645  Pt. Resting in bed comfortably. Verbal and bedside Shift changed report given to Floridalma Garcia RN (oncoming RN) on Pt. Condition. Report consisted of patients Situation, History, Activities, intake/output,  Background, Assessment and Recommendations(SBAR). Information from the following report(s) Kardex, order Summary, Lab results and MAR was reviewed with the receiving nurse. Opportunity for questions and clarification was provided.

## 2021-10-22 LAB
ANION GAP SERPL CALC-SCNC: 9 MMOL/L (ref 3–18)
BUN SERPL-MCNC: 60 MG/DL (ref 7–18)
BUN/CREAT SERPL: 21 (ref 12–20)
CALCIUM SERPL-MCNC: 7.8 MG/DL (ref 8.5–10.1)
CHLORIDE SERPL-SCNC: 110 MMOL/L (ref 100–111)
CO2 SERPL-SCNC: 23 MMOL/L (ref 21–32)
CREAT SERPL-MCNC: 2.9 MG/DL (ref 0.6–1.3)
ERYTHROCYTE [DISTWIDTH] IN BLOOD BY AUTOMATED COUNT: 13.4 % (ref 11.6–14.5)
GLUCOSE SERPL-MCNC: 135 MG/DL (ref 74–99)
HCT VFR BLD AUTO: 32.8 % (ref 36–48)
HGB BLD-MCNC: 11.4 G/DL (ref 13–16)
MAGNESIUM SERPL-MCNC: 1.9 MG/DL (ref 1.6–2.6)
MCH RBC QN AUTO: 36.5 PG (ref 24–34)
MCHC RBC AUTO-ENTMCNC: 34.8 G/DL (ref 31–37)
MCV RBC AUTO: 105.1 FL (ref 78–100)
PLATELET # BLD AUTO: 255 K/UL (ref 135–420)
PMV BLD AUTO: 9.4 FL (ref 9.2–11.8)
POTASSIUM SERPL-SCNC: 4.2 MMOL/L (ref 3.5–5.5)
RBC # BLD AUTO: 3.12 M/UL (ref 4.35–5.65)
SODIUM SERPL-SCNC: 142 MMOL/L (ref 136–145)
WBC # BLD AUTO: 9.6 K/UL (ref 4.6–13.2)

## 2021-10-22 PROCEDURE — 85027 COMPLETE CBC AUTOMATED: CPT

## 2021-10-22 PROCEDURE — 74011250637 HC RX REV CODE- 250/637: Performed by: UROLOGY

## 2021-10-22 PROCEDURE — 74011250637 HC RX REV CODE- 250/637: Performed by: HOSPITALIST

## 2021-10-22 PROCEDURE — 74011000250 HC RX REV CODE- 250: Performed by: HOSPITALIST

## 2021-10-22 PROCEDURE — 74011250637 HC RX REV CODE- 250/637: Performed by: INTERNAL MEDICINE

## 2021-10-22 PROCEDURE — 83735 ASSAY OF MAGNESIUM: CPT

## 2021-10-22 PROCEDURE — 80048 BASIC METABOLIC PNL TOTAL CA: CPT

## 2021-10-22 PROCEDURE — 74011250636 HC RX REV CODE- 250/636: Performed by: HOSPITALIST

## 2021-10-22 PROCEDURE — 97116 GAIT TRAINING THERAPY: CPT

## 2021-10-22 PROCEDURE — 36415 COLL VENOUS BLD VENIPUNCTURE: CPT

## 2021-10-22 PROCEDURE — 65270000029 HC RM PRIVATE

## 2021-10-22 RX ADMIN — ATORVASTATIN CALCIUM 10 MG: 10 TABLET, FILM COATED ORAL at 09:42

## 2021-10-22 RX ADMIN — ACETAMINOPHEN 650 MG: 325 TABLET ORAL at 23:01

## 2021-10-22 RX ADMIN — WATER 1 G: 1 INJECTION INTRAMUSCULAR; INTRAVENOUS; SUBCUTANEOUS at 13:42

## 2021-10-22 RX ADMIN — TAMSULOSIN HYDROCHLORIDE 0.4 MG: 0.4 CAPSULE ORAL at 09:42

## 2021-10-22 RX ADMIN — MAGNESIUM HYDROXIDE 30 ML: 2400 SUSPENSION ORAL at 09:42

## 2021-10-22 NOTE — PROGRESS NOTES
Hospitalist Progress Note    Patient: Lucina Barker MRN: [id number]  CSN: [id number]    YOB: 1933  Age: 80 y.o. Sex: male    DOA: 10/19/2021 LOS:  LOS: 3 days                Assessment/Plan     Patient Active Problem List   Diagnosis Code    Acute renal failure (ARF) ([facility] [address] N17.9    Bladder tumor D49.4    Hematuria R31.9    Hyponatremia E87.1    Urinary obstruction N13.9    UTI (urinary tract infection) N39.0        Chief complaint :  80 y.o. male with bladder tumor, s/p TURB on 10/14/2021, hypercholesterolemia is sent to ER from his PCP office for hypotension. In ER he is noted to have elevated creatine at 7. Creatine 3 weeks ago in normal range. Urinary obstruction. Acute kidney injury-  improving  secondary to urinary obstruction. Nephrology following     Urinary obstruction-  S/p Grossman  US with mild bilateral hydronephrosis. Seen by urology   Discharge with grossman     Gross hematuria-  Grossman in place,   S/p CBI    Episode of SVT with wide complex QRS  Known LBBB   Echo with normal LVF  Seen by cardiology      UTI-  On ceftriaxone,   Urine cultures no growth.     Hyponatremia-  resolved     Bladder tumor-  S/p bladder tumor resection on 10/14/2021. 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     DVT prophylaxis with SCD secondary to hematuria       Disposition : discharge once ok from nephrology    Review of systems  General: No fevers or chills. Cardiovascular: No chest pain or pressure. No palpitations. Pulmonary: No shortness of breath. Gastrointestinal: No nausea, vomiting. Physical Exam:  General: Awake, cooperative, no acute distress    HEENT: NC, Atraumatic. PERRLA, anicteric sclerae. Lungs: CTA Bilaterally. No Wheezing/Rhonchi/Rales. Heart:  S1 S2,  No murmur, No Rubs, No Gallops  Abdomen: Soft, Non distended, Non tender.  +Bowel sounds,   Extremities: No c/c/e  Psych:   Not anxious or agitated.   Neurologic:  No acute neurological deficit. Vital signs/Intake and Output:  Visit Vitals  BP (!) 140/73 (BP 1 Location: Left lower arm, BP Patient Position: At rest)   Pulse 62   Temp 97.8 °F (36.6 °C)   Resp 16   Ht 5' 9\" (1.753 m)   Wt 62.6 kg (138 lb)   SpO2 99%   BMI 20.38 kg/m²     Current Shift:  10/22 0701 - 10/22 1900  In: -   Out: 1000 [Urine:1000]  Last three shifts:  10/20 1901 - 10/22 0700  In: 57346   Out: 20545 [Urine:67543]            Labs: Results:       Chemistry Recent Labs     10/22/21  0235 10/21/21  0245 10/20/21  1946   * 100* 118*    139 137   K 4.2 4.4 4.5    111 107   CO2 23 20* 22   BUN 60* 70* 76*   CREA 2.90* 3.67* 4.33*   CA 7.8* 7.4* 7.7*   AGAP 9 8 8   BUCR 21* 19 18      CBC w/Diff Recent Labs     10/22/21  0235 10/21/21  0245 10/20/21  0225   WBC 9.6 9.0 10.1   RBC 3.12* 2.96* 3.10*   HGB 11.4* 10.7* 11.2*   HCT 32.8* 30.2* 32.2*    235 212      Cardiac Enzymes No results for input(s): CPK, CKND1, AFUA in the last 72 hours. No lab exists for component: CKRMB, TROIP   Coagulation No results for input(s): PTP, INR, APTT, INREXT, INREXT in the last 72 hours. Lipid Panel No results found for: CHOL, CHOLPOCT, CHOLX, CHLST, CHOLV, 178252, HDL, HDLP, LDL, LDLC, DLDLP, 555142, VLDLC, VLDL, TGLX, TRIGL, TRIGP, TGLPOCT, CHHD, CHHDX   BNP No results for input(s): BNPP in the last 72 hours. Liver Enzymes No results for input(s): TP, ALB, TBIL, AP in the last 72 hours.     No lab exists for component: SGOT, GPT, DBIL   Thyroid Studies No results found for: T4, T3U, TSH, TSHEXT, TSHEXT     Procedures/imaging: see electronic medical records for all procedures/Xrays and details which were not copied into this note but were reviewed prior to creation of Plan

## 2021-10-22 NOTE — PROGRESS NOTES
Cardiology Progress Note        Patient: Bishop Erwin        Sex: male          DOA: 10/19/2021  YOB: 1933      Age:  80 y.o.        LOS:  LOS: 3 days    Patient seen and examined, chart reviewed. Assessment/Plan     Patient Active Problem List   Diagnosis Code    Acute renal failure (ARF) ([facility] [address] N17.9    Bladder tumor D49.4    Hematuria R31.9    Hyponatremia E87.1    Urinary obstruction N13.9    UTI (urinary tract infection) N39.0      Wide QRS complex tachycardia     Patient had LHC, RHC and coronary angiogram done in 10/2020 reported     1. Coronaries: Normal coronary arteries. 2. Left ventricle: Normal left ventricular systolic function. 3. Normal right atrial pressure.     4. Normal pulmonary capillary wedge pressure. 5. Normal pulmonary artery pressures. 6. Normal thermodilution cardiac output and cardiac index.         Echocardiogram revealed      · Left Ventricle: Normal cavity size, wall thickness and systolic function (ejection fraction normal). The estimated EF is 55 - 60%. There is mild (grade 1) left ventricular diastolic dysfunction E/E' ratio = 13.17. Wall Scoring: The left ventricular wall motion is normal.  · Tricuspid Valve: Normal valve structure and no stenosis. Tricuspid regurgitation is inadequate for estimation of right ventricular systolic pressure.      [id number] gentleman status post bladder tumor resection being managed for urinary obstruction status post Hood catheter placement. He was in acute renal failure. Creatinine is improving. No significant electrolyte abnormality. Tele monitor reviewed. Tele monitor revealed wide QRS complex tachycardia at 150 beats per minute for 42 seconds. Patient has underlying left bundle branch block. Differential diagnosis is SVT with underlying left bundle versus nonsustained ventricular tachycardia. LVEF is normal.      Telemetry monitor reviewed.   No further episode of wide QRS complex tachycardia     Plan:     Monitor electrolytes. Continue telemetry monitoring. Call on call cardiologist on weekend if needed                Subjective:    cc:   denies any chest pain or shortness of breath    REVIEW OF SYSTEMS:     General: No fevers or chills. Cardiovascular: No chest pain,No palpitations, No orthopnea, No PND, No leg swelling, No claudication  Pulmonary: No shortness of breath. Gastrointestinal: No nausea, vomiting, bleeding  Neurology: No Dizziness    Objective:      Visit Vitals  BP (!) 140/73 (BP 1 Location: Left lower arm, BP Patient Position: At rest)   Pulse 62   Temp 97.8 °F (36.6 °C)   Resp 16   Ht 5' 9\" (1.753 m)   Wt 62.6 kg (138 lb)   SpO2 99%   BMI 20.38 kg/m²     Body mass index is 20.38 kg/m². Physical Exam:  General Appearance: Comfortable, not using accessory muscles of respiration. HEENT: LISA. HEAD: Atraumatic  NECK: No JVD, no thyroidomeglay. CAROTIDS: no bruit  LUNGS: Clear bilaterally. HEART: S1+S2 audible, no murmur, no pericardial rub. ABD: Non-tender, BS Audible    EXT: No edema, and no cyanosis. VASCULAR EXAM: Pulses are intact. PSYCHIATRIC EXAM: Mood is appropriate. MUSCULOSKELETAL: Grossly no joint deformity.   NEUROLOGICAL: AAO times 3, No motor and sensory deficit    Medication:  Current Facility-Administered Medications   Medication Dose Route Frequency    tamsulosin (FLOMAX) capsule 0.4 mg  0.4 mg Oral DAILY    magnesium hydroxide (MILK OF MAGNESIA) 400 mg/5 mL oral suspension 30 mL  30 mL Oral DAILY    influenza vaccine 2021-22 (6 mos+)(PF) (FLUARIX/FLULAVAL/FLUZONE QUAD) injection 0.5 mL  1 Each IntraMUSCular PRIOR TO DISCHARGE    acetaminophen (TYLENOL) tablet 650 mg  650 mg Oral Q6H PRN    atorvastatin (LIPITOR) tablet 10 mg  10 mg Oral DAILY    cefTRIAXone (ROCEPHIN) 1 g in sterile water (preservative free) 10 mL IV syringe  1 g IntraVENous Q24H               Lab/Data Reviewed:       Recent Labs     10/22/21  8876 10/21/21  0245 10/20/21  0225   WBC 9.6 9.0 10.1   HGB 11.4* 10.7* 11.2*   HCT 32.8* 30.2* 32.2*    235 212     Recent Labs     10/22/21  0235 10/21/21  0245 10/20/21  1946    139 137   K 4.2 4.4 4.5    111 107   CO2 23 20* 22   * 100* 118*   BUN 60* 70* 76*   CREA 2.90* 3.67* 4.33*   CA 7.8* 7.4* 7.7*       Signed By: Daja Garcia MD     October 22, 2021

## 2021-10-22 NOTE — PROGRESS NOTES
Bedside and verbal shift change report recieved from BISI Chester (offgoing nurse) given to Ashley Watson RN (oncoming nurse). Report included the following information SBAR, Kardex, Intake/Output, MAR and Recent Results       Bedside and verbal shift change report given to [facility] (oncoming nurse) by Vandana Renteria RN (offgoing nurse).  Report included the following information SBAR, Kardex, Intake/Output, MAR and Recent Results

## 2021-10-22 NOTE — PROGRESS NOTES
Problem: Mobility Impaired (Adult and Pediatric)  Goal: *Acute Goals and Plan of Care (Insert Text)  Description: Physical Therapy Goals   Initiated 10/21/2021 and to be accomplished within 5-7 day(s)  1. Patient will move from supine <> sit with S in prep for out of bed activity and change of position. 2.  Patient will perform sit<> stand with S with RW in prep for transfers/ambulation. 3.  Patient will transfer from bed <> chair with S with RW for time up in chair for completion of ADL activity. 4.  Patient will ambulate 150 feet with S/RW for improved functional mobility at discharge. 5.  Patient will ascend/descend 3-5 stairs with handrail(s) with minimal assistance/contact guard assist for home re-entry as needed. Outcome: Progressing Towards Goal  physical Therapy TREATMENT    Patient: Franco Erwin [de-identified]80 y.o. male)  Date: 10/22/2021  Diagnosis: Acute renal failure (ARF) (HCC) [N17.9] <principal problem not specified>       Precautions: Fall   Chart, physical therapy assessment, plan of care and goals were reviewed. ASSESSMENT:  Pt is fairly mobile and only requires cues/assist for standing transfer. Mild path deviations with RW. Mild fatigue noted, but pt reports wanting to be OOB more often. Pt is safe for ibanez amb with NSG of family (without IV). Will assess stair safety on an upcoming session. Progression toward goals:  [x]      Improving appropriately and progressing toward goals  []      Improving slowly and progressing toward goals  []      Not making progress toward goals and plan of care will be adjusted     PLAN:  Patient continues to benefit from skilled intervention to address the above impairments. Continue treatment per established plan of care. Discharge Recommendations:  Home Health and HHPT  Further Equipment Recommendations for Discharge:  rolling walker     SUBJECTIVE:   Patient stated I don't have any pain.     OBJECTIVE DATA SUMMARY:   Critical Behavior:  Neurologic State: Alert  Orientation Level: Oriented X4  Cognition: Follows commands  Safety/Judgement: Fall prevention, Awareness of environment  Functional Mobility Training:  Bed Mobility:  Rolling: Supervision  Supine to Sit: Supervision  Sit to Supine: Stand-by assistance  Transfers:  Sit to Stand: Minimum assistance  Stand to Sit: Contact guard assistance  Balance:  Sitting: Intact  Standing: Impaired; With support  Standing - Static: Fair  Standing - Dynamic : Fair  Ambulation/Gait Training:  Distance (ft): 350 Feet (ft)  Assistive Device: Walker, rolling;Gait belt  Ambulation - Level of Assistance: Contact guard assistance;Stand-by assistance  Gait Abnormalities: Decreased step clearance; Path deviations  Base of Support: Narrowed  Speed/Ximena: Fluctuations  Step Length: Left shortened;Right shortened  Swing Pattern: Left asymmetrical;Right asymmetrical  Therapeutic Exercises:     Pain:  Pain in: 0  Pain out: 0  Activity Tolerance:   Fair  Please refer to the flowsheet for vital signs taken during this treatment.   After treatment:   [] Patient left in no apparent distress sitting up in chair  [x] Patient left in no apparent distress in bed  [x] Call bell left within reach  [x] Nursing notified  [] Caregiver present  [] Bed alarm activated      Elda Enriquez PTA   Time Calculation: 34 mins

## 2021-10-22 NOTE — PROGRESS NOTES
CM spoke with  pt's daughter, Michael Elena ([phone]), to further discuss with transition of care. Pt's daughter has now indicated she would prefer to have pt's PCP facilitate HH. Pt's family will contact pt's PCP for follow up appointments as well as [place] after discharge. t this time. Anticipate pt will transition home with physician follow up with in the next 24-48 hours. Pt's family /care giver will transport pt home up on discharge. No other transition of care needs have been identified at this time. CM remains available to assist as needed. Care Management Interventions  Mode of Transport at Discharge:  Other (see comment) (Family/care giver)  Transition of Care Consult (CM Consult): Discharge Planning  Health Maintenance Reviewed: Yes  Physical Therapy Consult: Yes  Support Systems: Caregiver/Home Care Staff, Child(ajay) (private caregivers 11a-5/6p, 7 day w/k)  Confirm Follow Up Transport: Family  The Plan for Transition of Care is Related to the Following Treatment Goals : Home with physician follow up   The Patient and/or Patient Representative was Provided with a Choice of Provider and Agrees with the Discharge Plan?: Yes  Name of the Patient Representative Who was Provided with a Choice of Provider and Agrees with the Discharge Plan: daughter/Karen Erwin  Freedom of Choice List was Provided with Basic Dialogue that Supports the Patient's Individualized Plan of Care/Goals, Treatment Preferences and Shares the Quality Data Associated with the Providers?: Yes  Discharge Location  Discharge Placement: Home with family assistance

## 2021-10-22 NOTE — PROGRESS NOTES
Urology Progress Note    Patient: Nancie Vargas MRN: [id number] SSN: [id number]    YOB: 1933  Age: 80 y.o. Sex: male    DOA: 10/19/2021 LOS:  LOS: 3 days              Subjective:   Pt doing better this morning. He had BM and was able to ambulate with the help of Physical therapy. .    Objective:      Visit Vitals  BP (!) 167/90 (BP 1 Location: Right upper arm, BP Patient Position: At rest)   Pulse 63   Temp 97.8 °F (36.6 °C)   Resp 16   Ht 5' 9\" (1.753 m)   Wt 62.6 kg (138 lb)   SpO2 97%   BMI 20.38 kg/m²     Temp (24hrs), Av.9 °F (36.6 °C), Min:97.4 °F (36.3 °C), Max:98.3 °F (36.8 °C)      Intake and Output:  10/20 0701 - 10/21 190  In: 9000   Out: 73043 [Urine:81303]  10/21 1901 - 10/22 0700  In: 3000   Out: 80625 [Urine:52323]    Physical Exam:  Hood: clear on slow CBI    Lab/Data Reviewed:  BMP:   Lab Results   Component Value Date/Time     10/22/2021 02:35 AM    K 4.2 10/22/2021 02:35 AM     10/22/2021 02:35 AM    CO2 23 10/22/2021 02:35 AM    AGAP 9 10/22/2021 02:35 AM     (H) 10/22/2021 02:35 AM    BUN 60 (H) 10/22/2021 02:35 AM    CREA 2.90 (H) 10/22/2021 02:35 AM    GFRAA 25 (L) 10/22/2021 02:35 AM    GFRNA 21 (L) 10/22/2021 02:35 AM     CBC:   Lab Results   Component Value Date/Time    WBC 9.6 10/22/2021 02:35 AM    HGB 11.4 (L) 10/22/2021 02:35 AM    HCT 32.8 (L) 10/22/2021 02:35 AM     10/22/2021 02:35 AM       Medications Reviewed. Assessment/Plan:   Active Problems:    Acute renal failure (ARF) (HCC) (10/19/2021)      Bladder tumor ()      Hematuria (10/19/2021)      Hyponatremia (10/19/2021)      Urinary obstruction (10/19/2021)      UTI (urinary tract infection) (10/19/2021)        Status Post:     Impression: 80 yr old male s/p TURBT w post op retention causing ARF  His Urine output is now clear, HCT stable and Creatinine improving daily  Plan:  1. D/C CBI and plug 3 way catheter port  2.  Cont Flomax as outpatient: Rx sent by me to [facility]  3. Pt cleared urologically for discharge with grossman catheter and on Flomax  4.  VT my office Monday morning 8am    Alexandra Alvarado MD  October 22, 2021

## 2021-10-22 NOTE — PROGRESS NOTES
Nephrology Progress note    Subjective:     Romelia French is a 80 y.o. male  with PMHX of HLD and recent Dx of bladder tumour who was sent to the ER from his PCP office due to hypotension. Pt had recent TURB done on 10/14 for new dx of bladder tumour. After his recent procedure pt was not passing urine as much. He had some lower abd pain but denies fever, CP, SOB N/V. In the ER BP was normal and afebrile. Labs showed BUN/Cr 92/7.0 from 15/1.2 about 3 wks ago. In the ER grossman cath was placed and had ~700cc of bloody urine. Currently w/o complaints. No hx of HTN, DM or known cardiac disease.      -Pt w/o new complaints. Cr down, 7=>3.6=>2.9.  -CBI d/taco earlier. Grossman in place.     Admit Date: 10/19/2021  Active Problems:    Acute renal failure (ARF) ([facility] [address] (10/19/2021)      Bladder tumor ()      Hematuria (10/19/2021)      Hyponatremia (10/19/2021)      Urinary obstruction (10/19/2021)      UTI (urinary tract infection) (10/19/2021)      Current Facility-Administered Medications   Medication Dose Route Frequency    tamsulosin (FLOMAX) capsule 0.4 mg  0.4 mg Oral DAILY    magnesium hydroxide (MILK OF MAGNESIA) 400 mg/5 mL oral suspension 30 mL  30 mL Oral DAILY    influenza vaccine 2021-22 (6 mos+)(PF) (FLUARIX/FLULAVAL/FLUZONE QUAD) injection 0.5 mL  1 Each IntraMUSCular PRIOR TO DISCHARGE    acetaminophen (TYLENOL) tablet 650 mg  650 mg Oral Q6H PRN    atorvastatin (LIPITOR) tablet 10 mg  10 mg Oral DAILY    cefTRIAXone (ROCEPHIN) 1 g in sterile water (preservative free) 10 mL IV syringe  1 g IntraVENous Q24H         Allergy:   Allergies   Allergen Reactions    Sulfa (Sulfonamide Antibiotics) Anaphylaxis        Objective:     Visit Vitals  BP (!) 140/73 (BP 1 Location: Left lower arm, BP Patient Position: At rest)   Pulse 62   Temp 97.8 °F (36.6 °C)   Resp 16   Ht 5' 9\" (1.753 m)   Wt 62.6 kg (138 lb)   SpO2 99%   BMI 20.38 kg/m²         Intake/Output Summary (Last 24 hours) at 10/22/2021 1640  Last data filed at 10/22/2021 1113  Gross per 24 hour   Intake 3000 ml   Output 13935 ml   Net -14790 ml       Physical Exam:       General: No acute distress   HENT: Atraumatic and normocephalic   Eyes: Normal conjunctiva   Neck: Supple No JVD   Cardiovascular: Normal S1 & S2, no m/r/g   Pulmonary/Chest Wall: Clear to auscultation bilaterally   Abdominal: Soft and non-tender   Musculoskeletal: No edema   Neurological: No focal deficits     : Grossman catheter in place    Data Review:  Lab Results   Component Value Date/Time    Sodium 142 10/22/2021 02:35 AM    Potassium 4.2 10/22/2021 02:35 AM    Chloride 110 10/22/2021 02:35 AM    CO2 23 10/22/2021 02:35 AM    Anion gap 9 10/22/2021 02:35 AM    Glucose 135 (H) 10/22/2021 02:35 AM    BUN 60 (H) 10/22/2021 02:35 AM    Creatinine 2.90 (H) 10/22/2021 02:35 AM    BUN/Creatinine ratio 21 (H) 10/22/2021 02:35 AM    GFR est AA 25 (L) 10/22/2021 02:35 AM    GFR est non-AA 21 (L) 10/22/2021 02:35 AM    Calcium 7.8 (L) 10/22/2021 02:35 AM     Lab Results   Component Value Date/Time    WBC 9.6 10/22/2021 02:35 AM    HGB 11.4 (L) 10/22/2021 02:35 AM    HCT 32.8 (L) 10/22/2021 02:35 AM    PLATELET 670 04/42/8100 02:35 AM    .1 (H) 10/22/2021 02:35 AM     Lab Results   Component Value Date/Time    Calcium 7.8 (L) 10/22/2021 02:35 AM     No results found for: IRON, FE, TIBC, IBCT, PSAT, FERR  No results found for: FERR      Impression:     -Acute Kidney Injury: likely obstructive nephropathy. Improving on IVF and s/p grossman. Cr 7.0=>3.6=> 2.9 today.  Recent baseline Cr 1.2.  -Hyponatremia, mild: likely hypovolumic, corrected  -Gross hematuria   -Anemia: Hb 13.5=>11.4, likely sec to hematuria   -Leucocytosis : Urine Culture neg.   -Bladder tumour s/p TURB 10/14       Plan:     -Encourage po fluids  -Keep grossman for now  -Monitor chemistry  -Avoid ACEi, ARB, NSAIDs or IV dye for now  -No  indication for RRT        Chetan De MD, MPH [address] [facility]  [phone]

## 2021-10-23 VITALS
BODY MASS INDEX: 20.44 KG/M2 | OXYGEN SATURATION: 97 % | HEART RATE: 70 BPM | SYSTOLIC BLOOD PRESSURE: 139 MMHG | WEIGHT: 138 LBS | HEIGHT: 69 IN | DIASTOLIC BLOOD PRESSURE: 67 MMHG | RESPIRATION RATE: 18 BRPM | TEMPERATURE: 98 F

## 2021-10-23 LAB
ANION GAP SERPL CALC-SCNC: 6 MMOL/L (ref 3–18)
BUN SERPL-MCNC: 51 MG/DL (ref 7–18)
BUN/CREAT SERPL: 21 (ref 12–20)
CALCIUM SERPL-MCNC: 8 MG/DL (ref 8.5–10.1)
CHLORIDE SERPL-SCNC: 112 MMOL/L (ref 100–111)
CO2 SERPL-SCNC: 24 MMOL/L (ref 21–32)
CREAT SERPL-MCNC: 2.39 MG/DL (ref 0.6–1.3)
GLUCOSE SERPL-MCNC: 114 MG/DL (ref 74–99)
MAGNESIUM SERPL-MCNC: 1.9 MG/DL (ref 1.6–2.6)
POTASSIUM SERPL-SCNC: 4.6 MMOL/L (ref 3.5–5.5)
SODIUM SERPL-SCNC: 142 MMOL/L (ref 136–145)

## 2021-10-23 PROCEDURE — 36415 COLL VENOUS BLD VENIPUNCTURE: CPT

## 2021-10-23 PROCEDURE — 74011250637 HC RX REV CODE- 250/637: Performed by: INTERNAL MEDICINE

## 2021-10-23 PROCEDURE — 74011250637 HC RX REV CODE- 250/637: Performed by: HOSPITALIST

## 2021-10-23 PROCEDURE — 74011250637 HC RX REV CODE- 250/637: Performed by: UROLOGY

## 2021-10-23 PROCEDURE — 83735 ASSAY OF MAGNESIUM: CPT

## 2021-10-23 PROCEDURE — 80048 BASIC METABOLIC PNL TOTAL CA: CPT

## 2021-10-23 RX ORDER — ATORVASTATIN CALCIUM 10 MG/1
10 TABLET, FILM COATED ORAL DAILY
Qty: 30 TABLET | Refills: 0 | Status: SHIPPED | OUTPATIENT
Start: 2021-10-24 | End: 2021-11-09 | Stop reason: CLARIF

## 2021-10-23 RX ORDER — TAMSULOSIN HYDROCHLORIDE 0.4 MG/1
0.4 CAPSULE ORAL DAILY
Qty: 30 CAPSULE | Refills: 0 | Status: SHIPPED | OUTPATIENT
Start: 2021-10-24

## 2021-10-23 RX ADMIN — MAGNESIUM HYDROXIDE 30 ML: 2400 SUSPENSION ORAL at 09:53

## 2021-10-23 RX ADMIN — ATORVASTATIN CALCIUM 10 MG: 10 TABLET, FILM COATED ORAL at 09:53

## 2021-10-23 RX ADMIN — TAMSULOSIN HYDROCHLORIDE 0.4 MG: 0.4 CAPSULE ORAL at 09:53

## 2021-10-23 RX ADMIN — ACETAMINOPHEN 650 MG: 325 TABLET ORAL at 06:45

## 2021-10-23 NOTE — DISCHARGE INSTRUCTIONS
1. Cont Flomax as outpatient: Rx sent by MD to [facility] [address]  2. Pt cleared urologically for discharge with grossman catheter and on Flomax  3. VT my office Monday morning 8am          Acute Kidney Injury: Care Instructions  Overview     Acute kidney injury (VALENTE) is a sudden decrease in kidney function. This can happen over a period of hours, days or, in some cases, weeks. VALENTE used to be called acute renal failure, but kidney failure doesn't always happen with VALENTE. Common causes of VALENTE are serious infection, blood loss, and some medicines. When VALENTE happens, the kidneys have trouble removing waste and excess fluids from the body. The waste and fluids build up and become harmful. Kidney function may return to normal if the cause of VALENTE is treated quickly. Your chance of a full recovery depends on what caused the problem, how quickly the cause was treated, and what other medical problems you have. You may have a treatment called dialysis. It does the work of healthy kidneys to remove waste and fluids for a short time. Follow-up care is a key part of your treatment and safety. Be sure to make and go to all appointments, and call your doctor if you are having problems. It's also a good idea to know your test results and keep a list of the medicines you take. How can you care for yourself at home? · Talk to your doctor about how much fluid you should drink. · Eat a balanced diet. Talk to your doctor or a dietitian about what type of diet may be best for you. You may need to limit sodium, potassium, and phosphorus. · If you need dialysis, follow the instructions and schedule for dialysis that your doctor gives you. · Do not smoke. Smoking can make your condition worse. If you need help quitting, talk to your doctor about stop-smoking programs and medicines. These can increase your chances of quitting for good. · Limit alcohol. · Review all of your medicines with your doctor.  Do not take any medicines, including nonsteroidal anti-inflammatory drugs (NSAIDs), such as ibuprofen (Advil, Motrin) or naproxen (Aleve), unless your doctor says it is safe for you to do so. · Make sure that anyone treating you for any health problem knows that you have had VALENTE. When should you call for help? Call 911 anytime you think you may need emergency care. For example, call if:    · You passed out (lost consciousness). Call your doctor now or seek immediate medical care if:    · You have new or worse nausea and vomiting.     · You have much less urine than normal, or you have no urine.     · You are feeling confused or cannot think clearly.     · You have new or more blood in your urine.     · You have new swelling.     · You are dizzy or lightheaded, or feel like you may faint. Watch closely for changes in your health, and be sure to contact your doctor if:    · You do not get better as expected. Where can you learn more? Go to http://www.gray.com/  Enter U104 in the search box to learn more about \"Acute Kidney Injury: Care Instructions. \"  Current as of: December 17, 2020               Content Version: 13.0  © 0558-9126 [employer]. Care instructions adapted under license by [employer] (which disclaims liability or warranty for this information). If you have questions about a medical condition or this instruction, always ask your healthcare professional. [place] [address] any warranty or liability for your use of this information. Patient Education        Urinary Tract Infections (UTI) in Men: Care Instructions  Overview     A urinary tract infection, or UTI, is a term for an infection anywhere between the kidneys and the urethra. (The urethra is the tube that carries urine from the bladder to outside the body.) Most UTIs are bladder infections. They often cause pain or burning when you urinate. UTIs are caused by bacteria.  This means they can be cured with antibiotics. Be sure to complete your treatment so that the infection does not get worse. Follow-up care is a key part of your treatment and safety. Be sure to make and go to all appointments, and call your doctor if you are having problems. It's also a good idea to know your test results and keep a list of the medicines you take. How can you care for yourself at home? · Take your antibiotics as prescribed. Do not stop taking them just because you feel better. You need to take the full course of antibiotics. · Take your medicines exactly as prescribed. Your doctor may have prescribed a medicine, such as phenazopyridine (Pyridium), to help relieve pain when you urinate. This turns your urine orange. You may stop taking it when your symptoms get better. But be sure to take all of your antibiotics, which treat the infection. · Drink extra water for the next day or two. This will help make the urine less concentrated and help wash out the bacteria causing the infection. (If you have kidney, heart, or liver disease and have to limit your fluids, talk with your doctor before you increase your fluid intake.)  · Avoid drinks that are carbonated or have caffeine. They can irritate the bladder. · Urinate often. Try to empty your bladder each time. · To relieve pain, take a hot bath or lay a heating pad (set on low) over your lower belly or genital area. Never go to sleep with a heating pad in place. To help prevent UTIs  · Drink plenty of fluids. If you have kidney, heart, or liver disease and have to limit fluids, talk with your doctor before you increase the amount of fluids you drink. · Urinate when you have the urge. Do not hold your urine for a long time. Urinate before you go to sleep. · Keep your penis clean. Catheter care  If you have a drainage tube (catheter) in place, the following steps will help you care for it. · Always wash your hands before and after touching your catheter.   · Check the area around the urethra for inflammation or signs of infection. Signs of infection include irritated, swollen, red, or tender skin, or pus around the catheter. · Clean the area around the catheter with soap and water two times a day. Dry with a clean towel afterward. · Do not apply powder or lotion to the skin around the catheter. To empty the urine collection bag   · Wash your hands with soap and water. · Without touching the drain spout, remove the spout from its sleeve at the bottom of the collection bag. Open the valve on the spout. · Let the urine flow out of the bag and into the toilet or a container. Do not let the tubing or drain spout touch anything. · After you empty the bag, clean the end of the drain spout with tissue and water. Close the valve and put the drain spout back into its sleeve at the bottom of the collection bag. · Wash your hands with soap and water. When should you call for help? Call your doctor now or seek immediate medical care if:    · Symptoms such as a fever, chills, nausea, or vomiting get worse or happen for the first time.     · You have new pain in your back just below your rib cage. This is called flank pain.     · There is new blood or pus in your urine.     · You are not able to take or keep down your antibiotics. Watch closely for changes in your health, and be sure to contact your doctor if:    · You are not getting better after taking an antibiotic for 2 days.     · Your symptoms go away but then come back. Where can you learn more? Go to http://www.gray.com/  Enter W570 in the search box to learn more about \"Urinary Tract Infections (UTI) in Men: Care Instructions. \"  Current as of: February 10, 2021               Content Version: 13.0  © 8085-3634 [employer]. Care instructions adapted under license by [employer] (which disclaims liability or warranty for this information).  If you have questions about a medical condition or this instruction, always ask your healthcare professional. [address] any warranty or liability for your use of this information. Patient Education        Blood in the Urine: Care Instructions  Your Care Instructions     Blood in the urine, or hematuria, may make the urine look red, brown, or pink. There may be blood every time you urinate or just from time to time. You cannot always see blood in the urine, but it will show up in a urine test.  Blood in the urine may be serious. It should always be checked by a doctor. Your doctor may recommend more tests, including an X-ray, a CT scan, or a cystoscopy (which lets a doctor look inside the urethra and bladder). Blood in the urine can be a sign of another problem. Common causes are bladder infections and kidney stones. An injury to your groin or your genital area can also cause bleeding in the urinary tract. Very hard exercise--such as running a marathon--can cause blood in the urine. Blood in the urine can also be a sign of kidney disease or cancer in the bladder or kidney. Many cases of blood in the urine are caused by a harmless condition that runs in families. This is called benign familial hematuria. It does not need any treatment. Sometimes your urine may look red or brown even though it does not contain blood. For example, not getting enough fluids (dehydration), taking certain medicines, or having a liver problem can change the color of your urine. Eating foods such as beets, rhubarb, or blackberries or foods with red food coloring can make your urine look red or pink. Follow-up care is a key part of your treatment and safety. Be sure to make and go to all appointments, and call your doctor if you are having problems. It's also a good idea to know your test results and keep a list of the medicines you take. When should you call for help?    Call your doctor now or seek immediate medical care if:    · You have symptoms of a urinary infection. For example:  ? You have pus in your urine. ? You have pain in your back just below your rib cage. This is called flank pain. ? You have a fever, chills, or body aches. ? It hurts to urinate. ? You have groin or belly pain.     · You have more blood in your urine. Watch closely for changes in your health, and be sure to contact your doctor if:    · You have new urination problems.     · You do not get better as expected. Where can you learn more? Go to http://www.gray.com/  Enter L581 in the search box to learn more about \"Blood in the Urine: Care Instructions. \"  Current as of: February 10, 2021               Content Version: 13.0  © 2006-2021 [employer]. Care instructions adapted under license by [employer] (which disclaims liability or warranty for this information). If you have questions about a medical condition or this instruction, always ask your healthcare professional. [address] any warranty or liability for your use of this information.

## 2021-10-23 NOTE — PROGRESS NOTES
Problem: Falls - Risk of  Goal: *Absence of Falls  Description: Document Shai Dalton Fall Risk and appropriate interventions in the flowsheet. Outcome: Progressing Towards Goal  Note: Fall Risk Interventions:  Mobility Interventions: Bed/chair exit alarm, Patient to call before getting OOB         Medication Interventions: Patient to call before getting OOB, Teach patient to arise slowly    Elimination Interventions: Call light in reach, Patient to call for help with toileting needs              Problem: Patient Education: Go to Patient Education Activity  Goal: Patient/Family Education  Outcome: Progressing Towards Goal     Problem: Pressure Injury - Risk of  Goal: *Prevention of pressure injury  Description: Document Kamran Scale and appropriate interventions in the flowsheet. Outcome: Progressing Towards Goal  Note: Pressure Injury Interventions:  Sensory Interventions: Float heels, Minimize linen layers    Moisture Interventions: Absorbent underpads, Check for incontinence Q2 hours and as needed    Activity Interventions: Pressure redistribution bed/mattress(bed type)    Mobility Interventions: Pressure redistribution bed/mattress (bed type), Turn and reposition approx.  every two hours(pillow and wedges)    Nutrition Interventions: Document food/fluid/supplement intake    Friction and Shear Interventions: HOB 30 degrees or less, Minimize layers                Problem: Patient Education: Go to Patient Education Activity  Goal: Patient/Family Education  Outcome: Progressing Towards Goal     Problem: Patient Education: Go to Patient Education Activity  Goal: Patient/Family Education  Outcome: Progressing Towards Goal

## 2021-10-23 NOTE — PROGRESS NOTES
Nephrology Progress note    Subjective:     Romelia French is a 80 y.o. male  with PMHX of HLD and recent Dx of bladder tumour who was sent to the ER from his PCP office due to hypotension. Pt had recent TURB done on 10/14 for new dx of bladder tumour. After his recent procedure pt was not passing urine as much. He had some lower abd pain but denies fever, CP, SOB N/V. In the ER BP was normal and afebrile. Labs showed BUN/Cr 92/7.0 from 15/1.2 about 3 wks ago. In the ER grossman cath was placed and had ~700cc of bloody urine. Currently w/o complaints. No hx of HTN, DM or known cardiac disease.      -Pt w/o new complaints. Anxious to go home. Reports good appetite. Cr down, 7=>3.6=>2.9 yesterday.   -Grossman in place. UOP 1.5L yesterday.      Admit Date: 10/19/2021  Active Problems:    Acute renal failure (ARF) ([facility] [address] (10/19/2021)      Bladder tumor ()      Hematuria (10/19/2021)      Hyponatremia (10/19/2021)      Urinary obstruction (10/19/2021)      UTI (urinary tract infection) (10/19/2021)      Current Facility-Administered Medications   Medication Dose Route Frequency    tamsulosin (FLOMAX) capsule 0.4 mg  0.4 mg Oral DAILY    magnesium hydroxide (MILK OF MAGNESIA) 400 mg/5 mL oral suspension 30 mL  30 mL Oral DAILY    influenza vaccine 2021-22 (6 mos+)(PF) (FLUARIX/FLULAVAL/FLUZONE QUAD) injection 0.5 mL  1 Each IntraMUSCular PRIOR TO DISCHARGE    acetaminophen (TYLENOL) tablet 650 mg  650 mg Oral Q6H PRN    atorvastatin (LIPITOR) tablet 10 mg  10 mg Oral DAILY    cefTRIAXone (ROCEPHIN) 1 g in sterile water (preservative free) 10 mL IV syringe  1 g IntraVENous Q24H         Allergy:   Allergies   Allergen Reactions    Sulfa (Sulfonamide Antibiotics) Anaphylaxis        Objective:     Visit Vitals  BP (!) 151/98 (BP 1 Location: Left upper arm, BP Patient Position: At rest;Lying)   Pulse (!) 57   Temp 97.7 °F (36.5 °C)   Resp 16   Ht 5' 9\" (1.753 m)   Wt 62.6 kg (138 lb)   SpO2 98%   BMI 20.38 kg/m² Intake/Output Summary (Last 24 hours) at 10/23/2021 1032  Last data filed at 10/23/2021 8948  Gross per 24 hour   Intake    Output 2125 ml   Net -2125 ml       Physical Exam:       General: No acute distress   HENT: Atraumatic and normocephalic   Eyes: Normal conjunctiva   Neck: Supple No JVD   Cardiovascular: Normal S1 & S2, no m/r/g   Pulmonary/Chest Wall: Clear to auscultation bilaterally   Abdominal: Soft and non-tender   Musculoskeletal: No edema   Neurological: No focal deficits     : Grossman catheter in place    Data Review:  Lab Results   Component Value Date/Time    Sodium 142 10/22/2021 02:35 AM    Potassium 4.2 10/22/2021 02:35 AM    Chloride 110 10/22/2021 02:35 AM    CO2 23 10/22/2021 02:35 AM    Anion gap 9 10/22/2021 02:35 AM    Glucose 135 (H) 10/22/2021 02:35 AM    BUN 60 (H) 10/22/2021 02:35 AM    Creatinine 2.90 (H) 10/22/2021 02:35 AM    BUN/Creatinine ratio 21 (H) 10/22/2021 02:35 AM    GFR est AA 25 (L) 10/22/2021 02:35 AM    GFR est non-AA 21 (L) 10/22/2021 02:35 AM    Calcium 7.8 (L) 10/22/2021 02:35 AM     Lab Results   Component Value Date/Time    WBC 9.6 10/22/2021 02:35 AM    HGB 11.4 (L) 10/22/2021 02:35 AM    HCT 32.8 (L) 10/22/2021 02:35 AM    PLATELET 612 38/20/6037 02:35 AM    .1 (H) 10/22/2021 02:35 AM     Lab Results   Component Value Date/Time    Calcium 7.8 (L) 10/22/2021 02:35 AM     No results found for: IRON, FE, TIBC, IBCT, PSAT, FERR  No results found for: FERR      Impression:     -Acute Kidney Injury: likely obstructive nephropathy. Improving on IVF and s/p grossman. Cr 7.0=>3.6=> 2.9 yesterday.  Recent baseline Cr 1.2.  -Hyponatremia, mild: likely hypovolumic, corrected  -Gross hematuria   -Anemia: Hb 13.5=>11.4, likely sec to hematuria   -Leucocytosis : Urine Culture neg.   -Bladder tumour s/p TURB 10/14       Plan:     -Encourage po fluids  -Keep grossman for now, follow up with urology  -Monitor chemistry, pending lab today  -Avoid ACEi, ARB, NSAIDs or IV dye for now  -d/w Dr Eunice Jessica, ok to d/c from renal standpoint unless Cr goes up  2720 Daniel Daugherty MD,   VIA [facility]

## 2021-10-23 NOTE — PROGRESS NOTES
3852 - Bedside shift report received from Hetal Tao, [address] [place] [address]. Assumed care of patient. Patient noted resting at this time. Call light in reach. 3841 - Assessment complete. Patient alert and oriented x4. No s/s of any respiratory distress. Hood intact and draining. Denies any pain/discomfort. Patient in bed eating breakfast at this time. Call light in reach.

## 2021-10-23 NOTE — DISCHARGE SUMMARY
Discharge Summary    Patient: Renzo George MRN: [id number]  CSN: [id number]    YOB: 1933  Age: 80 y.o. Sex: male    DOA: 10/19/2021 LOS:  LOS: 4 days   Discharge Date:      Primary Care Provider:  Dina Chowdhury MD    Admission Diagnoses: Acute renal failure (ARF) [facility]) [N17.9]    Discharge Diagnoses:    Problem List as of 10/23/2021 Never Reviewed        Codes Class Noted - Resolved    Acute renal failure (ARF) ([facility] [address] ICD-10-CM: N17.9  ICD-9-CM: 584.9  10/19/2021 - Present        Bladder tumor ICD-10-CM: D49.4  ICD-9-CM: 239.4  Unknown - Present        Hematuria ICD-10-CM: R31.9  ICD-9-CM: 599.70  10/19/2021 - Present        Hyponatremia ICD-10-CM: E87.1  ICD-9-CM: 276.1  10/19/2021 - Present        Urinary obstruction ICD-10-CM: N13.9  ICD-9-CM: 599.60  10/19/2021 - Present        UTI (urinary tract infection) ICD-10-CM: N39.0  ICD-9-CM: 599.0  10/19/2021 - Present              Discharge Medications:     Current Discharge Medication List      START taking these medications    Details   tamsulosin (FLOMAX) 0.4 mg capsule Take 1 Capsule by mouth daily. Qty: 30 Capsule, Refills: 0  Start date: 10/24/2021      atorvastatin (LIPITOR) 10 mg tablet Take 1 Tablet by mouth daily. Qty: 30 Tablet, Refills: 0  Start date: 10/24/2021         CONTINUE these medications which have NOT CHANGED    Details   cyanocobalamin, vitamin B-12, (VITAMIN B-12 PO) Take  by mouth daily. zinc gluconate 30 mg tab Take  by mouth daily. iron fum-FA-B complex-C-mins 29 mg iron- 400 mcg tab Take  by mouth daily. STOP taking these medications       aspirin delayed-release 81 mg tablet Comments:   Reason for Stopping:         simvastatin (ZOCOR) 20 mg tablet Comments:   Reason for Stopping:               Discharge Condition: Stable    Procedures : [place]od cath    Consults: Cardiology, Urology and Nephrology      PHYSICAL EXAM   Visit Vitals  BP (!) 151/98 (BP 1 Location: Left upper arm, BP Patient Position:  At rest;Lying)   Pulse (!) 57   Temp 97.7 °F (36.5 °C)   Resp 16   Ht 5' 9\" (1.753 m)   Wt 62.6 kg (138 lb)   SpO2 98%   BMI 20.38 kg/m²     General: Awake, cooperative, no acute distress    HEENT: NC, Atraumatic. PERRLA, EOMI. Anicteric sclerae. Lungs:  CTA Bilaterally. No Wheezing/Rhonchi/Rales. Heart:  Regular  rhythm,  No murmur, No Rubs, No Gallops  Abdomen: Soft, Non distended, Non tender. +Bowel sounds,   Extremities: No c/c/e  Psych:   Not anxious or agitated. Neurologic:  No acute neurological deficits. Admission HPI :   Gillian Ovalles is a 80 y.o. male with bladder tumor, s/p resection on 10/14/2021, hypercholesterolemia is sent to ER from his PCP office. Patient reports that he has been feeling abdominal discomfort since the procedure. He had bladder tumor resection done on 10/14/2021 for bladder tumor. He has not been passing urine since then. He was seen by his PCP today and he was noted to be hypotensive and sent to ER. He denies any fever chills, chest pain, shortness of breath, nausea, vomiting. He is vaccinated against COVID-19. In ER his BUN/creatinine 92/7. His previous renal function 3 weeks ago in normal range. His sodium at 128, WBC of 13.7 he had Grossman placed with the 700 mL of bloody urine output.       Hospital Course :   80 y. o. male with bladder tumor, s/p TURB on 10/14/2021, hypercholesterolemia is sent to ER from his PCP office for hypotension. In ER he is noted to have elevated creatine at 7. Creatine 3 weeks ago in normal range. Urinary obstruction.      Acute kidney injury: improving. secondary to urinary obstruction. Nephrology was consulted and following through his hospital stay. Bcer improving. Avoid Nephrotoxic agent. Discussed the case with nephrologist who is clear the pt to be discharge     Urinary obstruction: S/p Grossman  US with mild bilateral hydronephrosis. Seen by urology /Dr. Kyaw Yang. Discharge with grossman.   He will follow-up with Dr. Jhoan Young on Monday     Gross hematuria: Hood in place,   S/p CBI     Episode of SVT with wide complex QRS  Known LBBB   Echo with normal LVF  Seen by cardiology. Continue home regimen     UTI: On ceftriaxone,   Urine cultures no growth.     Hyponatremia: resolved     Bladder tumor: S/p bladder tumor resection on 10/14/2021. 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    Activity: Activity as tolerated    Diet: Cardiac Diet    Follow-up: with PCP and Urology within 1 wk    Disposition: Home    Minutes spent on discharge: 45 min       Labs: Results:       Chemistry Recent Labs     10/22/21  0235 10/21/21  0245 10/20/21  1946   * 100* 118*    139 137   K 4.2 4.4 4.5    111 107   CO2 23 20* 22   BUN 60* 70* 76*   CREA 2.90* 3.67* 4.33*   CA 7.8* 7.4* 7.7*   AGAP 9 8 8   BUCR 21* 19 18      CBC w/Diff Recent Labs     10/22/21  0235 10/21/21  0245   WBC 9.6 9.0   RBC 3.12* 2.96*   HGB 11.4* 10.7*   HCT 32.8* 30.2*    235      Cardiac Enzymes No results for input(s): CPK, CKND1, AFUA in the last 72 hours. No lab exists for component: CKRMB, TROIP   Coagulation No results for input(s): PTP, INR, APTT, INREXT in the last 72 hours. Lipid Panel No results found for: CHOL, CHOLPOCT, CHOLX, CHLST, CHOLV, 953040, HDL, HDLP, LDL, LDLC, DLDLP, 136872, VLDLC, VLDL, TGLX, TRIGL, TRIGP, TGLPOCT, CHHD, CHHDX   BNP No results for input(s): BNPP in the last 72 hours. Liver Enzymes No results for input(s): TP, ALB, TBIL, AP in the last 72 hours. No lab exists for component: SGOT, GPT, DBIL   Thyroid Studies No results found for: T4, T3U, TSH, TSHEXT         Significant Diagnostic Studies: US RETROPERITONEUM COMP    Result Date: 10/20/2021  Retroperitoneal Ultrasound History: Urinary obstruction, patient with prior bladder tumor resection Comparison: No prior study available for comparison.  Technique: Multiple gray scale sonographic images of the kidneys and bladder were obtained. Additional color Doppler and the Doppler waveform images were obtained . Findings: The right kidney measures 10.4 cm in length and is normal in echotexture. No focal lesions are seen. There is mild hydronephrosis. The left kidney measures 9.4 cm in length and is normal in echotexture. No focal lesions are seen. There is mild hydronephrosis. Urinary bladder contains a Hood catheter, with somewhat mixed echogenicity debris present floating within the bladder lumen. 1. Mild bilateral hydronephrosis. 2. Indwelling Hood catheter with retention balloon present in the urinary bladder.   > Likely small quantity of intravesicular clot present. XR RETROGRADE PYELOGRAM BILAT    Result Date: 10/20/2021  See impression. Fluoroscopy was provided for this procedure under the supervision and/or direction of the attending provider. ? For further information regarding this procedure, see patient medical record. ECHO ADULT COMPLETE    Result Date: 10/21/2021  · Left Ventricle: Normal cavity size, wall thickness and systolic function (ejection fraction normal). The estimated EF is 55 - 60%. There is mild (grade 1) left ventricular diastolic dysfunction E/E' ratio = 13.17. Wall Scoring: The left ventricular wall motion is normal. · Tricuspid Valve: Normal valve structure and no stenosis. Tricuspid regurgitation is inadequate for estimation of right ventricular systolic pressure. No results found for this or any previous visit.            CC: Evelia Sawyer MD

## 2021-10-23 NOTE — ROUTINE PROCESS
Dual AVS reviewed with Zenon Guadarrama RN. All medications reviewed individually with patient. Opportunities for questions and concerns provided. Patient verbalized understanding and verified by teachback. IV discontinued, no redness, swelling or pain noted. Patient discharged via (mode of transport ie. Car, ambulance or air transport) car. Patient's arm band appropriately discarded.

## 2021-10-28 NOTE — PROGRESS NOTES
Physician Progress Note      Gaby Francois  [facility] #:                  [id number]  :                       3/9/1933  ADMIT DATE:       10/19/2021 12:09 PM  [address] [place] DATE:        10/23/2021 1:42 PM  RESPONDING  PROVIDER #:        Jesse Griffith MD          QUERY TEXT:    Patient admitted with VALENTE . Noted documentation of  UTI in  H&P. Urine CX No growth (<1,000 CFU/ML)  . In order to support the diagnosis of UTI  , please include additional clinical indicators in your documentation. Or please document if the diagnosis of  UTI  has been ruled out after further study. The medical record reflects the following:  Risk Factors: urinary obstruction  Clinical Indicators:  Per ER note: presents to the emergency department C/O low abdominal pain. Patient reports has had pain since a Grossman catheter was removed. He has noted intermittent hematuria and difficulty emptying his bladder  H&P: urinary obstruction- s/p grossman , gross hematuria, UTI  UA:  leukocyte esterase Large, WBC TNTC, Bacteria few, RBC TNTC  Urine CX No growth (<1,000 CFU/ML)  Discharge summary: UTI on ceftriaxone  Urine cultures no growth  Treatment: On ceftriaxone  Options provided:  -- UTI  present as evidenced by, Please document evidence. -- UTI  was ruled out  -- Other - I will add my own diagnosis  -- Disagree - Not applicable / Not valid  -- Disagree - Clinically unable to determine / Unknown  -- Refer to Clinical Documentation Reviewer    PROVIDER RESPONSE TEXT:    UT was ruled out after study.     Query created by: Se Ramírez on 10/21/2021 1:03 PM      Electronically signed by:  Jesse Griffith MD 10/28/2021 2:01 PM

## 2021-11-09 ENCOUNTER — HOSPITAL ENCOUNTER (OUTPATIENT)
Dept: PREADMISSION TESTING | Age: 86
Discharge: HOME | End: 2021-11-09

## 2021-11-09 VITALS — HEIGHT: 69 IN | BODY MASS INDEX: 20.73 KG/M2 | WEIGHT: 140 LBS

## 2021-11-09 RX ORDER — SIMVASTATIN 20 MG/1
20 TABLET, FILM COATED ORAL DAILY
COMMUNITY

## 2021-11-09 RX ORDER — LEVOFLOXACIN 5 MG/ML
500 INJECTION, SOLUTION INTRAVENOUS ONCE
Status: CANCELLED | OUTPATIENT
Start: 2021-11-18 | End: 2021-11-18

## 2021-11-09 RX ORDER — SODIUM CHLORIDE, SODIUM LACTATE, POTASSIUM CHLORIDE, CALCIUM CHLORIDE 600; 310; 30; 20 MG/100ML; MG/100ML; MG/100ML; MG/100ML
125 INJECTION, SOLUTION INTRAVENOUS CONTINUOUS
Status: CANCELLED | OUTPATIENT
Start: 2021-11-18

## 2021-11-09 NOTE — PERIOP NOTES
Patient advised to wear mask when entering any of the buildings. Being fully vaccinated, they will still need to be tested and quarantine prior to procedure. Patient does not meet criteria for special pop. No hx of sleep apnea or previous screening. Denies hx of MH. PCP is aware of surgery. CHG skin care kit given and process reviewed. Not participating in any research study or clinical trials. Patient instructed when coming in for surgery, do not use any lotions, creams or deodorant. Also to remove all jewelry. Instructed to wear something loose fitting and comfortable, easy to take off, easy to put on. Coming 11-15 AM for covid test. Bringing copy of advanced directive.

## 2021-11-15 ENCOUNTER — HOSPITAL ENCOUNTER (OUTPATIENT)
Dept: PREADMISSION TESTING | Age: 86
Discharge: HOME | End: 2021-11-15
Payer: MEDICARE

## 2021-11-15 PROCEDURE — U0003 INFECTIOUS AGENT DETECTION BY NUCLEIC ACID (DNA OR RNA); SEVERE ACUTE RESPIRATORY SYNDROME CORONAVIRUS 2 (SARS-COV-2) (CORONAVIRUS DISEASE [COVID-19]), AMPLIFIED PROBE TECHNIQUE, MAKING USE OF HIGH THROUGHPUT TECHNOLOGIES AS DESCRIBED BY CMS-2020-01-R: HCPCS

## 2021-11-16 LAB — SARS-COV-2, NAA: NOT DETECTED

## 2021-11-17 NOTE — H&P
Urology [address] [place], [place] [address] [address]  Tel: [phone]  Fax: [phone]    Patient : Eliana Fatima   YOB: 1933   Birth Sex: Male      Current Gender: Male      Date:  10/25/2021 8:00 AM    Visit Type:   Office Visit     Completed Orders (This Visit)  Order Details Reason Side Interpretation Result Initial Treatment Date Region   Voiding Trial     20F catheter removed w/o difficulty. DAKSHA       Assessment/Plan  # Detail Type Description    1. Assessment Other retention of urine (R33.8). Patient Plan Patient is unable to void this afternoon. He has evidence of urinary tension. Hood catheter placed with light pink/clear urine. Will continue with Flomax and voiding trial Thursday morning         2. Assessment Gross hematuria (R31.0). Patient Plan Patient with gross hematuria, he had this postoperatively after having urinary tension. Since that time urine has cleared. 3. 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 Patient Plan Patient underwent transurethral resection bladder tumor 10/14/2021. Findings revealed evidence of T1 carcinoma. High-grade. I reviewed the findings with the patient and his caretaker. At this time he will need to undergo restaging of his bladder tumor with a repeat transurethral resection of bladder tumor risks including pain infection bleeding postop retention reviewed will schedule this in next several weeks after patient recovers from most recent procedure. 4. Other Orders Orders not associated to today's assessments. Plan Orders The patient had the following order(s) completed today: Voiding Trial. Obtained on 10/25/2021, Result details: 20F catheter removed w/o difficulty. DAKSHA . Additional Visit Information      This 80year old male presents for Hematuria. History of Present Illness  1. Hematuria   The patient presents with hematuria (gross).  Pertinent history includes being at least 36years of age but not exposure to radiation and smoking or urolithiasis. The patient denies chills, fever, nausea and vomiting. Additional information: Pt with intermittent gross hematuria that began 4 mo ago. He has no risk factors,No exposure to toxic chemicals or radiation. He has not smoked cigarettes in over 40 years. CT scan performed 08/24/2021 revealed 1.2 x 1.9 x 3.7 soft tissue mass at the base of the urinary bladder center left of midline suspicious for urothelial neoplasm abnormal soft tissue extends to the UV junction bilaterally and associated mild distension the distal right ureter the distal left ureter is normal in caliber. 10/25/2021  Patient is here this afternoon for a follow-up. On 10/14/2021 he underwent transurethral resection of bladder tumor. He subsequently was admitted to [facility] with gross hematuria and urinary retention causing acute renal failure. He was then discharged yesterday he had been on Flomax since saw him [facility] on 10/21/2021. His Hood catheter was removed this morning however he has been unable to void has over 350 mL in his bladder. In addition I reviewed the patient's pathology with the patient and his caretaker. 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 Nursing Comments  Intake Comments: 20F Hood removed w/o difficulty. voiding trial instructions discussed with patient and caregiver. Pt will return by 4pm for PVR, POST OP PATH REVIEW.  Barbie Shell    Past Medical/Surgical History   (Reviewed, updated)  Disease/disorder Onset Date Management Date Comments   Depression 02/06/2015   VK 09/25/2018 -major depression, mild, single episode     TURBT, B RPG placement B stents MED 10/14/2021      Cervical laminectomy 2003      Appendectomy 1947 VK 09/25/2018 -at age 15 yr       Problem List  Problem List reviewed. Problem Description Onset Date Chronic Clinical Status Notes   Seborrhea 07/07/2017 N     Mild major depression 02/06/2015 N     Coronary arteriosclerosis 09/25/2013 N     Shoulder pain  N       Medications (active prior to today)  Medication Instructions Start Date Stop Date Refilled Elsewhere   Aspirin Low Dose 81 mg tablet,delayed release take 1 tablet by oral route 2 times every day 09/24/2019   N   simvastatin 20 mg tablet take 1 tablet by oral route  every day in the evening //   Y   gemcitabine 2 gram intravenous solution INSTILL 2 GRAM BY INTRAVESICAL ROUTE INTO THE BLADDER DILUTE WITH 50 ML SODIUM CHLORIDE 10/08/2021   N   cephalexin 500 mg capsule take 1 capsule by oral route  every 12 hours 10/14/2021   N   tramadol 50 mg tablet take 1 tablet by oral route  every 6 hours as needed 10/14/2021   N   Flomax 0.4 mg capsule take 1 capsule by oral route  every day 1/2 hour following the same meal each day 10/22/2021   N       Medication Reconciliation  Medications reconciled today.     Medication Reviewed  Adherence Medication Name Sig Desc Elsewhere Status   taking as directed gemcitabine 2 gram intravenous solution INSTILL 2 GRAM BY INTRAVESICAL ROUTE INTO THE BLADDER DILUTE WITH 50 ML SODIUM CHLORIDE N Verified   taking as directed cephalexin 500 mg capsule take 1 capsule by oral route  every 12 hours N Verified   taking as directed Flomax 0.4 mg capsule take 1 capsule by oral route  every day 1/2 hour following the same meal each day N Verified   taking as directed tramadol 50 mg tablet take 1 tablet by oral route  every 6 hours as needed N Verified   taking as directed simvastatin 20 mg tablet take 1 tablet by oral route  every day in the evening Y Verified   taking as directed Aspirin Low Dose 81 mg tablet,delayed release take 1 tablet by oral route 2 times every day N Verified     Allergies  Ingredient Reaction (Severity) Medication Name Comment   SULFA (SULFONAMIDE ANTIBIOTICS) Wheezing       Reviewed, no changes. Family History   (Reviewed, updated)    Relationship Family Member Name  Age at Death Condition Onset Age Cause of Death   Father  Y  Coronary artery disease 68 Y   Mother    Fracture of proximal end of femur ( following hip fracture)  Y   Mother    Cancer, endometrial  N     Social History  (Reviewed, updated)  Tobacco use reviewed. Preferred language is [place]. Marital Status/Family/Social Support  Marital status:      Tobacco use status: Ex-cigarette smoker. Smoking status: Former smoker. Tobacco Screening  Patient has used tobacco.     Smoking Status  Type Smoking Status Usage Per Day Years Used Pack Years Total Pack Years   Cigarette Former smoker         Alcohol  There is a history of alcohol use. 2 drinks consumed regular basis. Caffeine  The patient uses caffeine: coffee - 2 cups a day. Review of Systems  System Neg/Pos Details   Constitutional Negative Chills and Fever. ENMT Negative Ear infections and Sore throat. Eyes Negative Blurred vision, Double vision and Eye pain. Respiratory Negative Asthma, Chronic cough, Dyspnea and Wheezing. Cardio Negative Chest pain. GI Negative Constipation, Decreased appetite, Diarrhea, Nausea and Vomiting. Endocrine Negative Cold intolerance, Heat intolerance, Increased thirst and Weight loss. Neuro Negative Headache and Tremors. Psych Negative Anxiety and Depression. Integumentary Negative Itching skin and Rash. MS Negative Back pain and Joint pain. Hema/Lymph Negative Easy bleeding. Reproductive Negative Sexual dysfunction.        Vital Signs   Height  Time ft in cm Last Measured Height Position   4:14 PM 5.0 9.00 175.26 2020 0     Weight/BSA/BMI  Time lb oz kg Context BMI kg/m2 BSA m2   4:14 .00  65.771  21.41      Measured by  Time Measured by   4:14 PM Melani Kolb       Catheterization/intravesical treatment  Indication for procedure  Feeling of incomplete bladder emptying  R39.14. Patient consent  Consent was obtained. Questions were encouraged and answered. The procedure and risks were explained in detail. Catheterization  The patient presents for a catheter change. The catheter was removed. Insertion of an indwelling temporary catheter was then performed without difficulty. [facility] catheter size is 20. The catheter type was coude tip. Catheter attachments include a leg drainage bag. Residual urine obtained: 300.00ml. Post procedure  Patient tolerated the procedure well. Performed by  Belkis Baum. Uroflow  Feeling of incomplete bladder emptying R39.14. Consent was obtained. The procedure and risks were explained in detail. Questions were encouraged and answered. Patient was prepped and draped in the usual fashion. Procedure   Sonographic residual urine: 351mL. Time after void: 6 Hours 30 Minutes. Comments:. Physician: Tracie Dietrich MD. Date: 10/25/2021. Time: 4:27 PM.    Post procedure  Instructions: Rikki Lema         Medications (added, continued, or stopped today)  Start Date Medication Directions PRN Status PRN Reason Instruction Stop Date   09/24/2019 Aspirin Low Dose 81 mg tablet,delayed release take 1 tablet by oral route 2 times every day N      10/14/2021 cephalexin 500 mg capsule take 1 capsule by oral route  every 12 hours N      10/22/2021 Flomax 0.4 mg capsule take 1 capsule by oral route  every day 1/2 hour following the same meal each day N      10/08/2021 gemcitabine 2 gram intravenous solution INSTILL 2 GRAM BY INTRAVESICAL ROUTE INTO THE BLADDER DILUTE WITH 50 ML SODIUM CHLORIDE N       simvastatin 20 mg tablet take 1 tablet by oral route  every day in the evening N      10/14/2021 tramadol 50 mg tablet take 1 tablet by oral route  every 6 hours as needed N          Active Patient Care Team Members  Name Contact Agency Type Support Role Relationship Active Date Inactive Date Specialty   Bryn Quiroz   Patient provider PCP   [facility] BARBI Erwin    Spouse          Provider:    Magali Kevin MD 10/25/2021 7:49 PM     Document generated by:  Melvena Angelucci. Darby 10/25/2021 07:49 PM      ----------------------------------------------------------------------------------------------------------------------------------------------------------------------      Electronically signed by Magali Kevin MD on 10/26/2021 07:32 PM

## 2021-11-18 ENCOUNTER — ANESTHESIA (OUTPATIENT)
Dept: SURGERY | Age: 86
End: 2021-11-18
Payer: MEDICARE

## 2021-11-18 ENCOUNTER — HOSPITAL ENCOUNTER (OUTPATIENT)
Age: 86
Discharge: HOME | End: 2021-11-19
Attending: UROLOGY | Admitting: UROLOGY
Payer: MEDICARE

## 2021-11-18 ENCOUNTER — ANESTHESIA EVENT (OUTPATIENT)
Dept: SURGERY | Age: 86
End: 2021-11-18
Payer: MEDICARE

## 2021-11-18 PROBLEM — C67.9 BLADDER CANCER (HCC): Status: ACTIVE | Noted: 2021-11-18

## 2021-11-18 PROCEDURE — 74011000250 HC RX REV CODE- 250: Performed by: NURSE ANESTHETIST, CERTIFIED REGISTERED

## 2021-11-18 PROCEDURE — 76010000149 HC OR TIME 1 TO 1.5 HR: Performed by: UROLOGY

## 2021-11-18 PROCEDURE — 76210000016 HC OR PH I REC 1 TO 1.5 HR: Performed by: UROLOGY

## 2021-11-18 PROCEDURE — 77030020782 HC GWN BAIR PAWS FLX 3M -B: Performed by: UROLOGY

## 2021-11-18 PROCEDURE — 74011000250 HC RX REV CODE- 250: Performed by: STUDENT IN AN ORGANIZED HEALTH CARE EDUCATION/TRAINING PROGRAM

## 2021-11-18 PROCEDURE — 88305 TISSUE EXAM BY PATHOLOGIST: CPT

## 2021-11-18 PROCEDURE — 74011250636 HC RX REV CODE- 250/636: Performed by: UROLOGY

## 2021-11-18 PROCEDURE — 74011250636 HC RX REV CODE- 250/636: Performed by: STUDENT IN AN ORGANIZED HEALTH CARE EDUCATION/TRAINING PROGRAM

## 2021-11-18 PROCEDURE — 77010033678 HC OXYGEN DAILY

## 2021-11-18 PROCEDURE — 2709999900 HC NON-CHARGEABLE SUPPLY: Performed by: UROLOGY

## 2021-11-18 PROCEDURE — 77030040361 HC SLV COMPR DVT MDII -B: Performed by: UROLOGY

## 2021-11-18 PROCEDURE — 88307 TISSUE EXAM BY PATHOLOGIST: CPT

## 2021-11-18 PROCEDURE — 77030040831 HC BAG URINE DRNG MDII -A: Performed by: UROLOGY

## 2021-11-18 PROCEDURE — 76060000033 HC ANESTHESIA 1 TO 1.5 HR: Performed by: UROLOGY

## 2021-11-18 PROCEDURE — 74011250637 HC RX REV CODE- 250/637: Performed by: UROLOGY

## 2021-11-18 PROCEDURE — 74011250636 HC RX REV CODE- 250/636: Performed by: NURSE ANESTHETIST, CERTIFIED REGISTERED

## 2021-11-18 PROCEDURE — 77030005546 HC CATH URETH FOL 3W BARD -A: Performed by: UROLOGY

## 2021-11-18 PROCEDURE — 77030012508 HC MSK AIRWY LMA AMBU -A: Performed by: NURSE ANESTHETIST, CERTIFIED REGISTERED

## 2021-11-18 RX ORDER — FUROSEMIDE 10 MG/ML
INJECTION INTRAMUSCULAR; INTRAVENOUS AS NEEDED
Status: DISCONTINUED | OUTPATIENT
Start: 2021-11-18 | End: 2021-11-18 | Stop reason: HOSPADM

## 2021-11-18 RX ORDER — ATROPA BELLADONNA AND OPIUM 16.2; 3 MG/1; MG/1
SUPPOSITORY RECTAL AS NEEDED
Status: DISCONTINUED | OUTPATIENT
Start: 2021-11-18 | End: 2021-11-18 | Stop reason: HOSPADM

## 2021-11-18 RX ORDER — SODIUM CHLORIDE 0.9 % (FLUSH) 0.9 %
5-40 SYRINGE (ML) INJECTION AS NEEDED
Status: DISCONTINUED | OUTPATIENT
Start: 2021-11-18 | End: 2021-11-18 | Stop reason: HOSPADM

## 2021-11-18 RX ORDER — LABETALOL HYDROCHLORIDE 5 MG/ML
5 INJECTION, SOLUTION INTRAVENOUS
Status: DISCONTINUED | OUTPATIENT
Start: 2021-11-18 | End: 2021-11-18 | Stop reason: HOSPADM

## 2021-11-18 RX ORDER — DIPHENHYDRAMINE HYDROCHLORIDE 50 MG/ML
12.5 INJECTION, SOLUTION INTRAMUSCULAR; INTRAVENOUS
Status: DISCONTINUED | OUTPATIENT
Start: 2021-11-18 | End: 2021-11-18 | Stop reason: HOSPADM

## 2021-11-18 RX ORDER — SODIUM CHLORIDE, SODIUM LACTATE, POTASSIUM CHLORIDE, CALCIUM CHLORIDE 600; 310; 30; 20 MG/100ML; MG/100ML; MG/100ML; MG/100ML
125 INJECTION, SOLUTION INTRAVENOUS CONTINUOUS
Status: DISCONTINUED | OUTPATIENT
Start: 2021-11-18 | End: 2021-11-19 | Stop reason: HOSPADM

## 2021-11-18 RX ORDER — SODIUM CHLORIDE, SODIUM LACTATE, POTASSIUM CHLORIDE, CALCIUM CHLORIDE 600; 310; 30; 20 MG/100ML; MG/100ML; MG/100ML; MG/100ML
50 INJECTION, SOLUTION INTRAVENOUS CONTINUOUS
Status: DISCONTINUED | OUTPATIENT
Start: 2021-11-18 | End: 2021-11-18 | Stop reason: HOSPADM

## 2021-11-18 RX ORDER — EPHEDRINE SULFATE/0.9% NACL/PF 50 MG/5 ML
SYRINGE (ML) INTRAVENOUS AS NEEDED
Status: DISCONTINUED | OUTPATIENT
Start: 2021-11-18 | End: 2021-11-18 | Stop reason: HOSPADM

## 2021-11-18 RX ORDER — ONDANSETRON 2 MG/ML
4 INJECTION INTRAMUSCULAR; INTRAVENOUS
Status: DISCONTINUED | OUTPATIENT
Start: 2021-11-18 | End: 2021-11-19 | Stop reason: HOSPADM

## 2021-11-18 RX ORDER — SODIUM CHLORIDE 0.9 % (FLUSH) 0.9 %
5-40 SYRINGE (ML) INJECTION EVERY 8 HOURS
Status: DISCONTINUED | OUTPATIENT
Start: 2021-11-18 | End: 2021-11-18 | Stop reason: HOSPADM

## 2021-11-18 RX ORDER — TAMSULOSIN HYDROCHLORIDE 0.4 MG/1
0.4 CAPSULE ORAL DAILY
Status: DISCONTINUED | OUTPATIENT
Start: 2021-11-19 | End: 2021-11-19 | Stop reason: HOSPADM

## 2021-11-18 RX ORDER — NALBUPHINE HYDROCHLORIDE 10 MG/ML
5 INJECTION, SOLUTION INTRAMUSCULAR; INTRAVENOUS; SUBCUTANEOUS
Status: DISCONTINUED | OUTPATIENT
Start: 2021-11-18 | End: 2021-11-18 | Stop reason: HOSPADM

## 2021-11-18 RX ORDER — HYDROMORPHONE HYDROCHLORIDE 2 MG/ML
0.5 INJECTION, SOLUTION INTRAMUSCULAR; INTRAVENOUS; SUBCUTANEOUS AS NEEDED
Status: DISCONTINUED | OUTPATIENT
Start: 2021-11-18 | End: 2021-11-18 | Stop reason: HOSPADM

## 2021-11-18 RX ORDER — SODIUM CHLORIDE, SODIUM LACTATE, POTASSIUM CHLORIDE, CALCIUM CHLORIDE 600; 310; 30; 20 MG/100ML; MG/100ML; MG/100ML; MG/100ML
75 INJECTION, SOLUTION INTRAVENOUS CONTINUOUS
Status: DISCONTINUED | OUTPATIENT
Start: 2021-11-18 | End: 2021-11-19 | Stop reason: HOSPADM

## 2021-11-18 RX ORDER — SODIUM CHLORIDE 0.9 % (FLUSH) 0.9 %
5-40 SYRINGE (ML) INJECTION EVERY 8 HOURS
Status: DISCONTINUED | OUTPATIENT
Start: 2021-11-18 | End: 2021-11-19 | Stop reason: HOSPADM

## 2021-11-18 RX ORDER — FENTANYL CITRATE 50 UG/ML
50 INJECTION, SOLUTION INTRAMUSCULAR; INTRAVENOUS
Status: DISCONTINUED | OUTPATIENT
Start: 2021-11-18 | End: 2021-11-18 | Stop reason: HOSPADM

## 2021-11-18 RX ORDER — PROPOFOL 10 MG/ML
INJECTION, EMULSION INTRAVENOUS AS NEEDED
Status: DISCONTINUED | OUTPATIENT
Start: 2021-11-18 | End: 2021-11-18 | Stop reason: HOSPADM

## 2021-11-18 RX ORDER — HYDRALAZINE HYDROCHLORIDE 20 MG/ML
10 INJECTION INTRAMUSCULAR; INTRAVENOUS
Status: DISCONTINUED | OUTPATIENT
Start: 2021-11-18 | End: 2021-11-19 | Stop reason: HOSPADM

## 2021-11-18 RX ORDER — ONDANSETRON 2 MG/ML
INJECTION INTRAMUSCULAR; INTRAVENOUS AS NEEDED
Status: DISCONTINUED | OUTPATIENT
Start: 2021-11-18 | End: 2021-11-18 | Stop reason: HOSPADM

## 2021-11-18 RX ORDER — FENTANYL CITRATE 50 UG/ML
INJECTION, SOLUTION INTRAMUSCULAR; INTRAVENOUS AS NEEDED
Status: DISCONTINUED | OUTPATIENT
Start: 2021-11-18 | End: 2021-11-18 | Stop reason: HOSPADM

## 2021-11-18 RX ORDER — ATROPA BELLADONNA AND OPIUM 16.2; 3 MG/1; MG/1
1 SUPPOSITORY RECTAL
Status: DISCONTINUED | OUTPATIENT
Start: 2021-11-18 | End: 2021-11-19

## 2021-11-18 RX ORDER — LIDOCAINE HYDROCHLORIDE 20 MG/ML
INJECTION, SOLUTION EPIDURAL; INFILTRATION; INTRACAUDAL; PERINEURAL AS NEEDED
Status: DISCONTINUED | OUTPATIENT
Start: 2021-11-18 | End: 2021-11-18 | Stop reason: HOSPADM

## 2021-11-18 RX ORDER — NALOXONE HYDROCHLORIDE 0.4 MG/ML
0.04 INJECTION, SOLUTION INTRAMUSCULAR; INTRAVENOUS; SUBCUTANEOUS
Status: DISCONTINUED | OUTPATIENT
Start: 2021-11-18 | End: 2021-11-18 | Stop reason: HOSPADM

## 2021-11-18 RX ORDER — SODIUM CHLORIDE 0.9 % (FLUSH) 0.9 %
5-40 SYRINGE (ML) INJECTION AS NEEDED
Status: DISCONTINUED | OUTPATIENT
Start: 2021-11-18 | End: 2021-11-19 | Stop reason: HOSPADM

## 2021-11-18 RX ORDER — HYDROCODONE BITARTRATE AND ACETAMINOPHEN 5; 325 MG/1; MG/1
1 TABLET ORAL
Status: DISCONTINUED | OUTPATIENT
Start: 2021-11-18 | End: 2021-11-19 | Stop reason: HOSPADM

## 2021-11-18 RX ORDER — LEVOFLOXACIN 5 MG/ML
500 INJECTION, SOLUTION INTRAVENOUS ONCE
Status: COMPLETED | OUTPATIENT
Start: 2021-11-18 | End: 2021-11-18

## 2021-11-18 RX ADMIN — ONDANSETRON HYDROCHLORIDE 4 MG: 2 INJECTION INTRAMUSCULAR; INTRAVENOUS at 12:16

## 2021-11-18 RX ADMIN — FENTANYL CITRATE 25 MCG: 50 INJECTION, SOLUTION INTRAMUSCULAR; INTRAVENOUS at 11:49

## 2021-11-18 RX ADMIN — PROPOFOL 150 MG: 10 INJECTION, EMULSION INTRAVENOUS at 11:31

## 2021-11-18 RX ADMIN — LIDOCAINE HYDROCHLORIDE 100 MG: 20 INJECTION, SOLUTION INTRAVENOUS at 11:31

## 2021-11-18 RX ADMIN — LEVOFLOXACIN 500 MG: 5 INJECTION, SOLUTION INTRAVENOUS at 11:23

## 2021-11-18 RX ADMIN — FUROSEMIDE 10 MG: 10 INJECTION, SOLUTION INTRAVENOUS at 12:16

## 2021-11-18 RX ADMIN — FENTANYL CITRATE 25 MCG: 50 INJECTION, SOLUTION INTRAMUSCULAR; INTRAVENOUS at 11:31

## 2021-11-18 RX ADMIN — FENTANYL CITRATE 25 MCG: 50 INJECTION, SOLUTION INTRAMUSCULAR; INTRAVENOUS at 11:44

## 2021-11-18 RX ADMIN — FENTANYL CITRATE 25 MCG: 50 INJECTION, SOLUTION INTRAMUSCULAR; INTRAVENOUS at 11:23

## 2021-11-18 RX ADMIN — LABETALOL HYDROCHLORIDE 5 MG: 5 INJECTION INTRAVENOUS at 13:13

## 2021-11-18 RX ADMIN — HYDROMORPHONE HYDROCHLORIDE 0.5 MG: 2 INJECTION, SOLUTION INTRAMUSCULAR; INTRAVENOUS; SUBCUTANEOUS at 12:53

## 2021-11-18 RX ADMIN — HYDRALAZINE HYDROCHLORIDE 10 MG: 20 INJECTION INTRAMUSCULAR; INTRAVENOUS at 18:04

## 2021-11-18 RX ADMIN — SODIUM CHLORIDE, SODIUM LACTATE, POTASSIUM CHLORIDE, AND CALCIUM CHLORIDE: 600; 310; 30; 20 INJECTION, SOLUTION INTRAVENOUS at 12:23

## 2021-11-18 RX ADMIN — Medication 10 ML: at 23:28

## 2021-11-18 RX ADMIN — SODIUM CHLORIDE, SODIUM LACTATE, POTASSIUM CHLORIDE, AND CALCIUM CHLORIDE 125 ML/HR: 600; 310; 30; 20 INJECTION, SOLUTION INTRAVENOUS at 10:30

## 2021-11-18 RX ADMIN — FLUORESCEIN SODIUM 25 MG: 100 INJECTION INTRAVENOUS at 11:55

## 2021-11-18 RX ADMIN — Medication 10 MG: at 11:38

## 2021-11-18 RX ADMIN — HYDROMORPHONE HYDROCHLORIDE 0.5 MG: 2 INJECTION, SOLUTION INTRAMUSCULAR; INTRAVENOUS; SUBCUTANEOUS at 12:36

## 2021-11-18 NOTE — PROGRESS NOTES
1400  Assumed care at this time. Patient alert and oriented x4. Denies SOB, chest pain. Shows no signs of distress. Patient lungs clear bilaterally. Cap refill < 3 sec to all extremities. Patient has 20 G IV to L hand CDI. Stated pain 2/10. CBI with yellow urine. Lyssa area clean. SCDs applied to BLE. Incentive spirometer at bedside. Patient reaches 1500 on IS. Bowel sounds present. Skin intact. Patient call light and possessions within reach. Bed locked and in lowest position. Nurse will continue to monitor. 1701  Paged on call. Patient BP high, recent manual 162/84.    1705  Dr. Bryn Berg returned call. Verbal order with readback: 10 mg hydralazine M0N prn for systolic >625. Keep fluids at [facility] for now. 1830  BP improved post hydralazine 119/72.    1909  Bedside and verbal shift change report given to [address] by Lianne Cartagena RN. Report included SBAR, kardex, MAR, and recent results.

## 2021-11-18 NOTE — BRIEF OP NOTE
Brief Postoperative Note    Patient: Carla Erwin  YOB: 1933  MRN: [id number]    Date of Procedure: 11/18/2021     Pre-Op Diagnosis: BLADDER CANCER    Post-Op Diagnosis: Same as preoperative diagnosis.       Procedure(s):  CYSTOSCOPY TRANSURETHRAL RESECTION OF BLADDER TUMOR WITH GEMCITABINE, RESTAGING    Surgeon(s):  Rocky Simmonds, MD    Surgical Assistant: Surg Asst-1: Rocio Bond    Anesthesia: Other     Estimated Blood Loss (mL): Minimal    Complications: None    Specimens: * No specimens in log *     Implants: * No implants in log *    Drains: * No LDAs found *    Findings: no evidence of residual tumor    Electronically Signed by Ki Boyce MD on 11/18/2021 at 11:28 AM

## 2021-11-18 NOTE — ANESTHESIA POSTPROCEDURE EVALUATION
Post-Anesthesia Evaluation and Assessment    Cardiovascular Function/Vital Signs  Visit Vitals  BP (!) 163/74   Pulse (!) 56   Temp 36.4 °C (97.6 °F)   Resp 11   Ht 5' 9\" (1.753 m)   Wt 60.1 kg (132 lb 6.4 oz)   SpO2 100%   BMI 19.55 kg/m²       Patient is status post Procedure(s):  CYSTOSCOPY TRANSURETHRAL RESECTION OF BLADDER TUMOR WITH GEMCITABINE, RESTAGING. Nausea/Vomiting: Controlled. Postoperative hydration reviewed and adequate. Pain:  Pain Scale 1: Numeric (0 - 10) (11/18/21 1253)  Pain Intensity 1: 6 (11/18/21 1253)   Managed. Neurological Status:   Neuro (WDL): Exceptions to WDL (11/18/21 1306)   At baseline. Mental Status and Level of Consciousness: Baseline and appropriate for discharge. Pulmonary Status:   O2 Device: Nasal cannula (11/18/21 1249)   Adequate oxygenation and airway patent. Complications related to anesthesia: None    Post-anesthesia assessment completed. No concerns. Patient has met all discharge requirements.     Signed By: Radha Arriaza MD    November 18, 2021

## 2021-11-18 NOTE — PERIOP NOTES
TRANSFER - OUT REPORT:    Verbal report given to Minnie MATHEWS on [employer]  being transferred to 202 (unit) for routine progression of care       Report consisted of patients Situation, Background, Assessment and   Recommendations(SBAR). Information from the following report(s) SBAR, ED Summary, OR Summary, Procedure Summary and Cardiac Rhythm SB  w/ BBB was reviewed with the receiving nurse. Lines:   Peripheral IV 11/18/21 Left Hand (Active)   Site Assessment Clean, dry, & intact 11/18/21 1306   Phlebitis Assessment 0 11/18/21 1306   Infiltration Assessment 0 11/18/21 1306   Dressing Status Clean, dry, & intact 11/18/21 1306   Dressing Type Tape; Transparent 11/18/21 1306   Hub Color/Line Status Pink; Infusing; Patent 11/18/21 1306        Opportunity for questions and clarification was provided.       Patient transported with:   O2 @ 2 liters  Registered Nurse

## 2021-11-18 NOTE — PERIOP NOTES
Patient states he has no one to stay with him, at home, tonight.  He wants to stay overnight in the hospital.

## 2021-11-18 NOTE — INTERVAL H&P NOTE
Update History & Physical    The Patient's History and Physical of October 25, 2021 was reviewed with the patient and I examined the patient. There was no change. The surgical site was confirmed by the patient and me. Plan:  The risk, benefits, expected outcome, and alternative to the recommended procedure have been discussed with the patient. Patient understands and wants to proceed with the procedure.     Electronically signed by Edna Garcia MD on 11/18/2021 at 11:08 AM

## 2021-11-18 NOTE — PERIOP NOTES
Patient transferred to unit 202 with nurse and dentures at bedside. VS stable.     Primary Nurse Suni Ralph and BISI Fishman performed a dual skin assessment on this patient No impairment noted      Visit Vitals  BP (!) 154/76 (BP 1 Location: Right arm, BP Patient Position: At rest)   Pulse (!) 56   Temp 97 °F (36.1 °C)   Resp 12   Ht 5' 9\" (1.753 m)   Wt 60.1 kg (132 lb 6.4 oz)   SpO2 97%   BMI 19.55 kg/m²

## 2021-11-18 NOTE — OP NOTES
Brief Postoperative Note     Patient: Bishop Erwin  YOB: 1933  MRN: [id number]     Date of Procedure: 11/18/2021      Pre-Op Diagnosis: BLADDER CANCER     Post-Op Diagnosis: Same as preoperative diagnosis.       Procedure(s):  CYSTOSCOPY TRANSURETHRAL RESECTION OF MEDIUM BLADDER TUMOR WITH GEMCITABINE, RESTAGING    Surgeon(s):  Lauri Sandhu MD     Surgical Assistant: Surg Asst-1: Harriet Singh     Anesthesia: Other      Estimated Blood Loss (mL): Minimal     Complications: None     Specimens: * No specimens in log *      Implants: * No implants in log *     Drains: * No LDAs found *     Findings: no evidence of residual tumor      Procedure Details: The patient was brought in the operating room and placed in the supine position. After the administration of general anesthesia, was placed in lithotomy position. I At this point, I then began by placing a 21-St Helenian cystoscope into the bladder. Cystourethroscopy was performed. There was evidence of healing bladder from previous resection which involve the base of the bladder near the trigone and the bladder neck. Both ureteral orifices were difficult to say. To this point the gave the patient fluorescein injection to help identify the ureters. I removed the cystoscope and then placed a 26-St Helenian resectoscope into the bladder. Using the resection loop, I began to reresect the area of the previous tumor. I sparingly cauterized until we were able to see both ureteral orifice ease draining yellow urine. I then continue to spot cautery until there was no evidence of any significant active bleeding. When there is no other residual tumor or area from previous resection identified, I used the Ellik evacuator and removed the resected tumor. I cauterized any other areas of bleeding. 25 St Helenian Hood catheter was placed into the bladder. And continuous bladder irrigation was initiated. Light pink to yellow fluorescein stained urine was seen.   The patient was extubated transferred to recovery room the specimen was sent to pathology for further evaluation. Patient tolerated the procedure well.

## 2021-11-18 NOTE — ANESTHESIA PREPROCEDURE EVALUATION
Relevant Problems   No relevant active problems       Anesthetic History   No history of anesthetic complications     Pertinent negatives: No PONV       Review of Systems / Medical History  Patient summary reviewed, nursing notes reviewed and pertinent labs reviewed    Pulmonary  Within defined limits        Smoker    Pertinent negatives: No COPD, asthma and sleep apneaPneumonia: LBBB. Neuro/Psych           Pertinent negatives: No seizures and CVA  Comments: Functional capacity and balance limitations. H/o vertigo, dizziness, syncope. Normal cardiac w/u. Cardiovascular              Hyperlipidemia  Pertinent negatives: No valvular problems/murmurs, CAD and CHF  Exercise tolerance: >4 METS: \"impaired functional capacity\"  Comments: Cath 10/2020:  1. Coronaries: Normal coronary arteries. 2. Left ventricle: Normal left ventricular systolic function. 3. Normal right atrial pressure.     4. Normal pulmonary capillary wedge pressure. 5. Normal pulmonary artery pressures.    6. Normal thermodilution cardiac output and cardiac index    Cleared by cardiology with cardiac \"risk for procedure slightly increased\"   GI/Hepatic/Renal  Within defined limits           Pertinent negatives: No liver disease and renal disease  Comments: 7 drinks/week Endo/Other  Within defined limits        Pertinent negatives: No diabetes and obesity   Other Findings              Physical Exam    Airway  Mallampati: II  TM Distance: 4 - 6 cm  Neck ROM: normal range of motion   Mouth opening: Normal     Cardiovascular    Rhythm: regular  Rate: normal         Dental  No notable dental hx       Pulmonary  Breath sounds clear to auscultation               Abdominal  GI exam deferred       Other Findings            Anesthetic Plan    ASA: 3  Anesthesia type: general          Induction: Intravenous  Anesthetic plan and risks discussed with: Patient

## 2021-11-18 NOTE — PERIOP NOTES
Reviewed PTA medication list with patient/caregiver and patient/caregiver denies any additional medications. Patient admits to having a responsible adult care for them at home for at least 24 hours after surgery. Patient encouraged to use gown warming system and informed that using said warming gown to regulate body temperature prior to a procedure has been shown to help reduce the risks of blood clots and infection. Patient's pharmacy of choice verified and documented in PTA medication section. Dual skin assessment & fall risk band verification completed with Cindy Salomon.

## 2021-11-19 ENCOUNTER — APPOINTMENT (OUTPATIENT)
Dept: VASCULAR SURGERY | Age: 86
End: 2021-11-19
Attending: UROLOGY
Payer: MEDICARE

## 2021-11-19 VITALS
DIASTOLIC BLOOD PRESSURE: 74 MMHG | TEMPERATURE: 98.1 F | BODY MASS INDEX: 19.61 KG/M2 | HEIGHT: 69 IN | WEIGHT: 132.4 LBS | RESPIRATION RATE: 16 BRPM | HEART RATE: 74 BPM | OXYGEN SATURATION: 98 % | SYSTOLIC BLOOD PRESSURE: 146 MMHG

## 2021-11-19 PROCEDURE — 93970 EXTREMITY STUDY: CPT

## 2021-11-19 PROCEDURE — 74011250637 HC RX REV CODE- 250/637: Performed by: UROLOGY

## 2021-11-19 PROCEDURE — 51798 US URINE CAPACITY MEASURE: CPT

## 2021-11-19 PROCEDURE — 74011250636 HC RX REV CODE- 250/636: Performed by: UROLOGY

## 2021-11-19 RX ORDER — ACETAMINOPHEN 325 MG/1
650 TABLET ORAL ONCE
Status: COMPLETED | OUTPATIENT
Start: 2021-11-19 | End: 2021-11-19

## 2021-11-19 RX ADMIN — ACETAMINOPHEN 650 MG: 325 TABLET ORAL at 14:59

## 2021-11-19 RX ADMIN — TAMSULOSIN HYDROCHLORIDE 0.4 MG: 0.4 CAPSULE ORAL at 09:30

## 2021-11-19 RX ADMIN — HYDRALAZINE HYDROCHLORIDE 10 MG: 20 INJECTION INTRAMUSCULAR; INTRAVENOUS at 07:48

## 2021-11-19 RX ADMIN — HYDROCODONE BITARTRATE AND ACETAMINOPHEN 1 TABLET: 5; 325 TABLET ORAL at 06:22

## 2021-11-19 NOTE — PROGRESS NOTES
Bedside and Verbal shift change report given to LUDWIG Hawk RN (oncoming nurse) by CARLA Reyna RN (offgoing nurse). Report included the following information SBAR, Kardex, Intake/Output, MAR and Recent Results.

## 2021-11-19 NOTE — PROGRESS NOTES
Problem: Falls - Risk of  Goal: *Absence of Falls  Description: Document Fallon Fall Risk and appropriate interventions in the flowsheet.   Outcome: Progressing Towards Goal  Note: Fall Risk Interventions:  Mobility Interventions: Communicate number of staff needed for ambulation/transfer         Medication Interventions: Teach patient to arise slowly, Patient to call before getting OOB, Evaluate medications/consider consulting pharmacy    Elimination Interventions: Call light in reach    History of Falls Interventions: Consult care management for discharge planning, Door open when patient unattended, Investigate reason for fall

## 2021-11-19 NOTE — DISCHARGE INSTRUCTIONS
Neil Franklin. Jhoan Young M.D. [facility]  [address] [place] [address], [address] [place] [address]  Office: [phone]  Fax:    [phone]    PROCEDURE: Procedure(s):  CYSTOSCOPY TRANSURETHRAL RESECTION OF BLADDER TUMOR WITH GEMCITABINE, RESTAGING    Notify [facility]. Urology IMMEDIATELY if any of the following occur:     You are unable to urinate. Urgency to urinate is not uncommon.  You find yourself urinating small frequent amounts associated with severe lower abdominal discomfort.  Bright red blood with clots in the urine. Some reddish urine is not uncommon and should be treated with increasing the amount of fluids you drink.  Temperature above 101.5° and / or chills.  You are nauseous and / or vomiting and you cannot hold down any fluids.  Your pain is not controlled with the pain medication prescribed. Special Considerations:      Do not drive for at least 24 hours after the procedure and until you are no longer taking narcotic pain medication and you are able to move and react without hesitation. MEDICATIONS:  Pain   []  Norco®   []  Percocet® []  Dilaudid®    []  Tramadol   Antibiotics   []  Cipro   [x]  Keflex    [] Levaquin   []  Bactrim DS®       Urination   []  Vesicare®   []  Flomax     Burning   []  Pyridium®   []  UribelTM     Nausea   []  Zofran®   []  Phenergan®     Miscellaneous   []           [] Prescriptions Written on Chart    [x] Prescriptions sent Electronically           Our office will call you tomorrow to schedule your first follow-up appointment. Please contact [facility]. Urology at [phone] or go to the nearest Emergency Department / Urgent Care facility for any other medical questions or concerns.

## 2021-11-19 NOTE — PROGRESS NOTES
7371  Bedside shift change report given to Minnie LORENZO RN (oncoming nurse) by Terry Bella (offgoing nurse). Report included the following information SBAR, Kardex, Intake/Output and MAR.     0745  Assumed care at this time. Patient alert and oriented x4. Denies SOB, chest pain. Shows no signs of distress. Patient lungs clear bilaterally. Cap refill < 3 sec to all extremities. Patient has 20 G IV to L hand CDI. Stated pain 4/10. SCDs bilat. Incentive spirometer at bedside. Patient reaches 1750 on IS. Bowel sounds present. Skin intact. Patient call light and possessions within reach. Bed locked and in lowest position. Nurse will continue to monitor. 200  Paged Dr. Isaak Carrera. Patient c/o mild pressure to abdomen. Unable to urinate. Must do so by 12. Patient also has new onset redness (per pt) to BLE. Small knot above R ankle, RLE 1+ edema, and bruising/brown discoloration to L side of L leg. Denies pain to legs, numbness, or tingling. [id number]  Paged Dr. Ander Talbot, on call. [address] to Dr. Isaak Carrera. Patient was only able to urinate 25 ml. Verbal order: If patient does not void by 12 pm, insert grossman. Grossman will be removed in office on Monday. Stat Duplex BLE. Patient now having severe burning/spasming. Per Dr. Isaak Carrera do not give pain medication/suppository because it will delay urination at this time. Encouraged ambulation again. 1254  Grossman inserted d/t retention. Patient did not void after the initial 25 ml. Bladder scan 283. Output 290. Significant pain decrease once inserted (10 to a 1). Leg bag connected for d.c.    2269  Paged Dr. Isaak Carrera. Duplex clear. Patient legs less red. Swelling decreased. Urine in leg bag is light red and thin. No thick blood or clots noted. Instructed to give tylenol 650 mg for patient HA and to tell patient to f/u with primary care about blood pressure (which patient states has been fluctuating and becoming elevated prior to surgery). BP stable at this time.  Dr. Isaak Carrera instructed to proceed with d/c. Patient awaiting caregiver to arrive for d.c.    1530  All medications reviewed individually with patient. Opportunities for questions and concerns provided. Patient to be discharged via (car, transport, ambulance). Patient's armband appropriately discarded.

## 2021-11-19 NOTE — DISCHARGE SUMMARY
Discharge Summary     Patient ID:  Fredo Macario  [id number]   66 y.o.  3/9/1933    Admit date: 11/18/2021    Discharge Date: 11/19/2021      Admitting Physician: Marianela Muse MD     Discharge Physician: Marianela Muse MD    Admission Diagnoses: Bladder cancer [facility]) [C67.9]    Last Procedure: Procedure(s):  CYSTOSCOPY TRANSURETHRAL RESECTION OF BLADDER TUMOR WITH GEMCITABINE, RESTAGING    Discharge Diagnoses: Active Problems:    Bladder cancer ([address] (11/18/2021)         Discharge Condition: Stable    Consults: None    Significant Diagnostic Studies: none     Hospital Course:   Normal hospital course for this procedure. Disposition: Home    Patient Instructions:   Current Discharge Medication List      CONTINUE these medications which have NOT CHANGED    Details   simvastatin (ZOCOR) 20 mg tablet Take 20 mg by mouth daily. tamsulosin (FLOMAX) 0.4 mg capsule Take 1 Capsule by mouth daily. Qty: 30 Capsule, Refills: 0      cyanocobalamin, vitamin B-12, (VITAMIN B-12 PO) Take  by mouth daily. zinc gluconate 30 mg tab Take  by mouth daily. iron fum-FA-B complex-C-mins 29 mg iron- 400 mcg tab Take  by mouth daily. Diet: As per preop    Activity: No straining, No heavy lifting more than 10 lbs x 1 week    Follow-up Appointments   Procedures    FOLLOW UP VISIT Appointment in: Other (Specify) My office will contact patient for follow-up     My office will contact patient for follow-up     Standing Status:   Standing     Number of Occurrences:   1     Order Specific Question:   Appointment in     Answer:    Other (Specify)          Signed:  Marianela Muse MD  November 19, 2021  6:34 AM

## 2021-11-19 NOTE — ROUTINE PROCESS
1343  TRANSFER - IN REPORT:    Verbal report received from Reta 11 on [employer]  being received from PACU for routine post - op      Report consisted of patients Situation, Background, Assessment and   Recommendations(SBAR). Information from the following report(s) SBAR, Kardex, OR Summary, Intake/Output, MAR, and Recent Results was reviewed with the receiving nurse. Opportunity for questions and clarification was provided. Assessment will be completed upon patients arrival to unit and care assumed.

## 2021-11-19 NOTE — PROGRESS NOTES
Transition of Care (FREDDY) Plan:          Pt admitted for an elective surgical procedure - patient had cystoscopy, transurethral resection of bladder tumor on 11/18/21. Pt is independent. Please encourage ambulation. No transition of care needs identified at this time. Anticipate pt will be medically stable for discharge within the next 24-48 hours with physician follow up. CM available to assist as needed. FREDDY Transportation:   How is patient being transported at discharge? Family/Friend      When? Once cleared by physician     Is transport scheduled? N/A      Follow-up appointment and transportation:   PCP/Specialist?  See AVS for Appointment         Who is transporting to the follow-up appointment? Self/Family/Friend      Is transport for follow up appointment scheduled? N/A    Communication plan (with patient/family): Who is being called? Patient or Next of Kin? Responsible party? Patient      What number(s) is to be used? See Facesheet      What service provider is calling for [facility] services? When are they calling? Readmission Risk?   (Green/Low; Yellow/Moderate; Red/High):  [employer] here to complete [place] including selection of the Healthcare Decision Maker Relationship (ie \"Primary\")

## 2021-11-19 NOTE — PROGRESS NOTES
1945 -  Head to toe assessment performed at this time. Pt denies any chest pain or SOB. Pt denies any numbness or tingling to extremities. Pt encouraged to manage pain and to use the incentive spirometer. Pt educated on the side effects of medications taken. Pt left with call light within reach and bed in low position. Will continue to monitor. 2300 - Pt in bed sleeping with no signs of distress. Bed in low position and call light within reach. Will continue to monitor    0010 - Pt stated that he as no no pain.

## 2021-11-19 NOTE — PROGRESS NOTES
Urology Progress Note    Patient: Mari Ledesma MRN: [id number] SSN: [id number]    YOB: 1933  Age: 80 y.o. Sex: male    DOA: 2021 LOS:  LOS: 0 days              Subjective:   Patient is doing very well this morning he is not having any complaints. He is tolerating p.o and had no problems with CBI last night. .    Objective:      Visit Vitals  /70 (BP 1 Location: Left upper arm, BP Patient Position: At rest;Supine)   Pulse 72   Temp 98.2 °F (36.8 °C)   Resp 18   Ht 5' 9\" (1.753 m)   Wt 60.1 kg (132 lb 6.4 oz)   SpO2 99%   BMI 19.55 kg/m²     Temp (24hrs), Av.9 °F (36.6 °C), Min:97 °F (36.1 °C), Max:98.2 °F (36.8 °C)      Intake and Output:   0701 -  1900  In: 4843 [P.O.:520; I.V.:1250]  Out: 9540 [Urine:4700]   190 -  0700  In: 3000   Out: 2525 [Urine:2525]    Physical Exam:    Hood: Normal clear on very slow drip CBI      Medications Reviewed. Assessment/Plan:   Active Problems:    Bladder cancer ([address] (2021)        Status Post:  Procedure(s):  CYSTOSCOPY TRANSURETHRAL RESECTION OF BLADDER TUMOR, RESTAGING   Impression: Patient is postop day #1 status post reresection of bladder tumor. Patient doing well with CBI running minimally urine is clear yellow. Plan:  1. DC continuous bladder irrigation: Done  2. DC Hood: None  3. DC home after tolerating p.o. and voiding. 4. Prescription for Keflex to be sent to his pharmacy  5.  My office will contact patient for follow-up to review pathology    Anastacia Goodwin MD  2021

## 2022-03-18 PROBLEM — N13.9 URINARY OBSTRUCTION: Status: ACTIVE | Noted: 2021-10-19

## 2022-03-19 PROBLEM — C67.9 BLADDER CANCER (HCC): Status: ACTIVE | Noted: 2021-11-18

## 2022-03-19 PROBLEM — R31.9 HEMATURIA: Status: ACTIVE | Noted: 2021-10-19

## 2022-03-19 PROBLEM — N39.0 UTI (URINARY TRACT INFECTION): Status: ACTIVE | Noted: 2021-10-19

## 2022-03-19 PROBLEM — N17.9 ACUTE RENAL FAILURE (ARF) (HCC): Status: ACTIVE | Noted: 2021-10-19

## 2022-03-19 PROBLEM — E87.1 HYPONATREMIA: Status: ACTIVE | Noted: 2021-10-19

## 2022-10-19 ENCOUNTER — HOSPITAL ENCOUNTER (OUTPATIENT)
Dept: PREADMISSION TESTING | Age: 87
Discharge: HOME | End: 2022-10-19
Payer: MEDICARE

## 2022-10-19 LAB
ANION GAP SERPL CALC-SCNC: 4 MMOL/L (ref 3–18)
BUN SERPL-MCNC: 37 MG/DL (ref 7–18)
BUN/CREAT SERPL: 21 (ref 12–20)
CALCIUM SERPL-MCNC: 8.7 MG/DL (ref 8.5–10.1)
CHLORIDE SERPL-SCNC: 107 MMOL/L (ref 100–111)
CO2 SERPL-SCNC: 29 MMOL/L (ref 21–32)
CREAT SERPL-MCNC: 1.77 MG/DL (ref 0.6–1.3)
ERYTHROCYTE [DISTWIDTH] IN BLOOD BY AUTOMATED COUNT: 13.5 % (ref 11.6–14.5)
GLUCOSE SERPL-MCNC: 97 MG/DL (ref 74–99)
HCT VFR BLD AUTO: 40.9 % (ref 36–48)
HGB BLD-MCNC: 13.7 G/DL (ref 13–16)
MCH RBC QN AUTO: 35.4 PG (ref 24–34)
MCHC RBC AUTO-ENTMCNC: 33.5 G/DL (ref 31–37)
MCV RBC AUTO: 105.7 FL (ref 78–100)
NRBC # BLD: 0 K/UL (ref 0–0.01)
NRBC BLD-RTO: 0 PER 100 WBC
PLATELET # BLD AUTO: 212 K/UL (ref 135–420)
PMV BLD AUTO: 9.4 FL (ref 9.2–11.8)
POTASSIUM SERPL-SCNC: 5 MMOL/L (ref 3.5–5.5)
RBC # BLD AUTO: 3.87 M/UL (ref 4.35–5.65)
SODIUM SERPL-SCNC: 140 MMOL/L (ref 136–145)
WBC # BLD AUTO: 6.5 K/UL (ref 4.6–13.2)

## 2022-10-19 PROCEDURE — 85027 COMPLETE CBC AUTOMATED: CPT

## 2022-10-19 PROCEDURE — 36415 COLL VENOUS BLD VENIPUNCTURE: CPT

## 2022-10-19 PROCEDURE — 80048 BASIC METABOLIC PNL TOTAL CA: CPT

## 2022-10-25 ENCOUNTER — HOSPITAL ENCOUNTER (OUTPATIENT)
Dept: PREADMISSION TESTING | Age: 87
Discharge: HOME | End: 2022-10-25

## 2022-10-25 VITALS — WEIGHT: 140 LBS | HEIGHT: 67 IN | BODY MASS INDEX: 21.97 KG/M2

## 2022-10-25 NOTE — PERIOP NOTES
Leave all valuables at home or loved ones;to include wallets/purse, money/credit cards, electronics  such as laptops and tablets. If you want to have your prescriptions filled here, please have some form of payment with your . Please arrange for your transportation home  Denies any prosthetics. Patient states family physician is aware of upcoming procedure/surgery. Stop nsaids 7 days prior to [place]. Do not put any lotion, jewelry, makeup, fingernail or toenail polish; no wigs, no private piercings; no tictac,gum and mouthwash. Denies sleep apnea. Denies family history of anesthesia complications. Please be aware that due to unforeseen circumstances, delays may occur and your patience will be appreciated. If you ae scheduled to be discharged the same day, please plan to be with us for most of the day. If an inpatient, room assignments may be delayed as well. Our priority is to make you as comfortable as possible and to keep your family informed of your status when possible. Denies shortness of breath nor chest pain while climbing stairs.   No dnr h/o chemo  Daughter/Karen states patient will be kept overnight per Dr Sarah Akbar

## 2022-10-26 NOTE — H&P
Urology [facility] [address] [address]  Tel: [phone]  Fax: [phone]    Patient : Jorge Leonard   YOB: 1933   Birth Sex: Male      Current Gender: Male      Date:  10/24/2022 12:46 PM    Visit Type:   Pre Op   Assessment/Plan  # Detail Type Description    1. Assessment Encounter for follow-up examination after radiotherapy for malignant neoplasm (A76). 2. 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 On cystoscopy today he had evidence recurrence in the dome close to the bladder neck. This most likely to cause of his recent hematuria. I explained to patient options and he will undergo repeat cystoscopy and transurethral resection of bladder tumor with instillation of gemcitabine. Patient understood will schedule this and notify patient. 3. Assessment Personal history of malignant neoplasm of bladder (Z85.51), Symptomatic. 4. 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 Additional Visit Information      This 80year old male presents for Bladder CA. History of Present Illness  1. Bladder CA   The patient is here today for scheduled follow up. The patient's status is without evidence of disease. The patient was diagnosed on 10/14/2021. Pertinent history includes being over 36years old and Former smoker. The patient  denies chest pain or nausea. Additional information: Patient with history of T1 high-grade transitional cell identified on 10/14/2021 when he underwent TURBT. He is here today T review pathology from his last resection which PATH: fibromuscular tissue with granulation tissue formation mixed acute and chronic inflammation foreign body giant cell reaction reactive/degenerative stromal changes rare detached atypical urothelial cells c/w Urothelial carcinoma.   I reviewed this with the patient and his daughter via face time in the room. Comments: 03/16/2022  Patient here today for follow-up. 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 He was treated 6 weeks with gemcitabine. He is here today for a 1st interval cystoscopy. 06/15/2022  Patient here today for follow-up he has done well since his last visit with no urinary difficulties or hematuria. He is here for interval cystoscopy   09/21/2022  Patient here today for a follow-up he has history of bladder cancer he has done well since her last visit however he did have an episode of hematuria and passing a clot. 10/24/22  Patient underwent interval cystoscopy for his bladder cancer on 09/21/2022. 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 N     Coronary arteriosclerosis 09/25/2013 N     Shoulder pain  N       Medications (active prior to today)  Medication Instructions Start Date Stop Date Refilled Elsewhere   simvastatin 20 mg tablet take 1 tablet by oral route  every day in the evening //   Y   gemcitabine 2 gram intravenous solution INSTILL 2 GRAMS BY INTRAVESICAL ROUTE INTO THE BLADDER, DILUTE WITH 50ML SODIUM CHLORIDE 10/20/2022 09/15/2023 10/20/2022 N         Allergies  Ingredient Reaction (Severity) Medication Name Comment   SULFA (SULFONAMIDE ANTIBIOTICS) Wheezing         Physical Exam  Exam Findings Details   Constitutional Normal Well developed. Neck Exam Normal Inspection - Normal.   Respiratory Normal Inspection - Normal.   Extremity Normal No edema. Neurological Normal Alert and oriented to person, place and time. Cranial nerves intact. No motor or sensory deficits. Psychiatric Normal Orientation - Oriented to time, place, person & situation. Appropriate mood and affect. Medications (added, continued, or stopped today)  Start Date Medication Directions PRN Status PRN Reason Instruction Stop Date   10/20/2022 gemcitabine 2 gram intravenous solution INSTILL 2 GRAMS BY INTRAVESICAL ROUTE INTO THE BLADDER, DILUTE WITH 50ML SODIUM CHLORIDE N   09/15/2023    simvastatin 20 mg tablet take 1 tablet by oral route  every day in the evening N          Active Patient Care Team Members  Name Contact Agency Type Support Role Relationship Active Date Inactive Date Specialty   [facility]   Patient provider PCP   [facility]    Spouse          Provider:    Suzanne Barnett MD 10/26/2022 6:47 AM     Document generated by:  Venora Gilford. Darby 10/26/2022 06:47 AM      ----------------------------------------------------------------------------------------------------------------------------------------------------------------------

## 2022-10-27 ENCOUNTER — ANESTHESIA (OUTPATIENT)
Dept: SURGERY | Age: 87
End: 2022-10-27
Payer: MEDICARE

## 2022-10-27 ENCOUNTER — HOSPITAL ENCOUNTER (OUTPATIENT)
Age: 87
Setting detail: OBSERVATION
Discharge: HOME | End: 2022-10-28
Attending: UROLOGY | Admitting: UROLOGY
Payer: MEDICARE

## 2022-10-27 ENCOUNTER — ANESTHESIA EVENT (OUTPATIENT)
Dept: SURGERY | Age: 87
End: 2022-10-27
Payer: MEDICARE

## 2022-10-27 PROBLEM — Z98.890 S/P CYSTOSCOPY: Status: ACTIVE | Noted: 2022-10-27

## 2022-10-27 PROCEDURE — 74011250637 HC RX REV CODE- 250/637: Performed by: UROLOGY

## 2022-10-27 PROCEDURE — 76210000000 HC OR PH I REC 2 TO 2.5 HR: Performed by: UROLOGY

## 2022-10-27 PROCEDURE — 88307 TISSUE EXAM BY PATHOLOGIST: CPT

## 2022-10-27 PROCEDURE — 74011000250 HC RX REV CODE- 250: Performed by: UROLOGY

## 2022-10-27 PROCEDURE — 74011250636 HC RX REV CODE- 250/636: Performed by: UROLOGY

## 2022-10-27 PROCEDURE — G0378 HOSPITAL OBSERVATION PER HR: HCPCS

## 2022-10-27 PROCEDURE — 88305 TISSUE EXAM BY PATHOLOGIST: CPT

## 2022-10-27 PROCEDURE — 74011000258 HC RX REV CODE- 258: Performed by: UROLOGY

## 2022-10-27 PROCEDURE — 74011000250 HC RX REV CODE- 250: Performed by: NURSE ANESTHETIST, CERTIFIED REGISTERED

## 2022-10-27 PROCEDURE — 76010000138 HC OR TIME 0.5 TO 1 HR: Performed by: UROLOGY

## 2022-10-27 PROCEDURE — 74011250636 HC RX REV CODE- 250/636: Performed by: NURSE ANESTHETIST, CERTIFIED REGISTERED

## 2022-10-27 PROCEDURE — 74011250636 HC RX REV CODE- 250/636: Performed by: ANESTHESIOLOGY

## 2022-10-27 PROCEDURE — 74011000250 HC RX REV CODE- 250: Performed by: ANESTHESIOLOGY

## 2022-10-27 PROCEDURE — 77030020782 HC GWN BAIR PAWS FLX 3M -B: Performed by: UROLOGY

## 2022-10-27 PROCEDURE — 77030040361 HC SLV COMPR DVT MDII -B: Performed by: UROLOGY

## 2022-10-27 PROCEDURE — 76060000032 HC ANESTHESIA 0.5 TO 1 HR: Performed by: UROLOGY

## 2022-10-27 PROCEDURE — 77030012508 HC MSK AIRWY LMA AMBU -A: Performed by: ANESTHESIOLOGY

## 2022-10-27 PROCEDURE — 2709999900 HC NON-CHARGEABLE SUPPLY: Performed by: UROLOGY

## 2022-10-27 RX ORDER — FENTANYL CITRATE 50 UG/ML
25 INJECTION, SOLUTION INTRAMUSCULAR; INTRAVENOUS
Status: DISCONTINUED | OUTPATIENT
Start: 2022-10-27 | End: 2022-10-27 | Stop reason: HOSPADM

## 2022-10-27 RX ORDER — NALBUPHINE HYDROCHLORIDE 10 MG/ML
5 INJECTION, SOLUTION INTRAMUSCULAR; INTRAVENOUS; SUBCUTANEOUS
Status: DISCONTINUED | OUTPATIENT
Start: 2022-10-27 | End: 2022-10-27 | Stop reason: HOSPADM

## 2022-10-27 RX ORDER — LEVOFLOXACIN 5 MG/ML
500 INJECTION, SOLUTION INTRAVENOUS ONCE
Status: COMPLETED | OUTPATIENT
Start: 2022-10-27 | End: 2022-10-27

## 2022-10-27 RX ORDER — EPHEDRINE SULFATE/0.9% NACL/PF 50 MG/5 ML
SYRINGE (ML) INTRAVENOUS AS NEEDED
Status: DISCONTINUED | OUTPATIENT
Start: 2022-10-27 | End: 2022-10-27 | Stop reason: HOSPADM

## 2022-10-27 RX ORDER — SODIUM CHLORIDE 0.9 % (FLUSH) 0.9 %
5-40 SYRINGE (ML) INJECTION AS NEEDED
Status: DISCONTINUED | OUTPATIENT
Start: 2022-10-27 | End: 2022-10-28 | Stop reason: HOSPADM

## 2022-10-27 RX ORDER — HYDROCODONE BITARTRATE AND ACETAMINOPHEN 5; 325 MG/1; MG/1
1 TABLET ORAL
Status: DISCONTINUED | OUTPATIENT
Start: 2022-10-27 | End: 2022-10-28

## 2022-10-27 RX ORDER — SODIUM CHLORIDE, SODIUM LACTATE, POTASSIUM CHLORIDE, CALCIUM CHLORIDE 600; 310; 30; 20 MG/100ML; MG/100ML; MG/100ML; MG/100ML
75 INJECTION, SOLUTION INTRAVENOUS CONTINUOUS
Status: DISCONTINUED | OUTPATIENT
Start: 2022-10-27 | End: 2022-10-28 | Stop reason: HOSPADM

## 2022-10-27 RX ORDER — SODIUM CHLORIDE, SODIUM LACTATE, POTASSIUM CHLORIDE, CALCIUM CHLORIDE 600; 310; 30; 20 MG/100ML; MG/100ML; MG/100ML; MG/100ML
125 INJECTION, SOLUTION INTRAVENOUS CONTINUOUS
Status: DISCONTINUED | OUTPATIENT
Start: 2022-10-27 | End: 2022-10-28 | Stop reason: HOSPADM

## 2022-10-27 RX ORDER — ONDANSETRON 2 MG/ML
4 INJECTION INTRAMUSCULAR; INTRAVENOUS ONCE
Status: DISCONTINUED | OUTPATIENT
Start: 2022-10-27 | End: 2022-10-27 | Stop reason: HOSPADM

## 2022-10-27 RX ORDER — HYDROMORPHONE HYDROCHLORIDE 1 MG/ML
0.2 INJECTION, SOLUTION INTRAMUSCULAR; INTRAVENOUS; SUBCUTANEOUS AS NEEDED
Status: DISCONTINUED | OUTPATIENT
Start: 2022-10-27 | End: 2022-10-27 | Stop reason: HOSPADM

## 2022-10-27 RX ORDER — NALOXONE HYDROCHLORIDE 0.4 MG/ML
0.04 INJECTION, SOLUTION INTRAMUSCULAR; INTRAVENOUS; SUBCUTANEOUS
Status: DISCONTINUED | OUTPATIENT
Start: 2022-10-27 | End: 2022-10-27 | Stop reason: HOSPADM

## 2022-10-27 RX ORDER — HYDRALAZINE HYDROCHLORIDE 20 MG/ML
10 INJECTION INTRAMUSCULAR; INTRAVENOUS ONCE
Status: COMPLETED | OUTPATIENT
Start: 2022-10-27 | End: 2022-10-27

## 2022-10-27 RX ORDER — SODIUM CHLORIDE 0.9 % (FLUSH) 0.9 %
5-40 SYRINGE (ML) INJECTION EVERY 8 HOURS
Status: DISCONTINUED | OUTPATIENT
Start: 2022-10-27 | End: 2022-10-28 | Stop reason: HOSPADM

## 2022-10-27 RX ORDER — ASPIRIN 81 MG/1
TABLET ORAL
COMMUNITY

## 2022-10-27 RX ORDER — PROPOFOL 10 MG/ML
INJECTION, EMULSION INTRAVENOUS AS NEEDED
Status: DISCONTINUED | OUTPATIENT
Start: 2022-10-27 | End: 2022-10-27 | Stop reason: HOSPADM

## 2022-10-27 RX ORDER — ONDANSETRON 2 MG/ML
4 INJECTION INTRAMUSCULAR; INTRAVENOUS
Status: DISCONTINUED | OUTPATIENT
Start: 2022-10-27 | End: 2022-10-28 | Stop reason: HOSPADM

## 2022-10-27 RX ORDER — SODIUM CHLORIDE 0.9 % (FLUSH) 0.9 %
5-40 SYRINGE (ML) INJECTION EVERY 8 HOURS
Status: DISCONTINUED | OUTPATIENT
Start: 2022-10-27 | End: 2022-10-27 | Stop reason: HOSPADM

## 2022-10-27 RX ORDER — FENTANYL CITRATE 50 UG/ML
INJECTION, SOLUTION INTRAMUSCULAR; INTRAVENOUS AS NEEDED
Status: DISCONTINUED | OUTPATIENT
Start: 2022-10-27 | End: 2022-10-27 | Stop reason: HOSPADM

## 2022-10-27 RX ORDER — SODIUM CHLORIDE, SODIUM LACTATE, POTASSIUM CHLORIDE, CALCIUM CHLORIDE 600; 310; 30; 20 MG/100ML; MG/100ML; MG/100ML; MG/100ML
50 INJECTION, SOLUTION INTRAVENOUS CONTINUOUS
Status: DISCONTINUED | OUTPATIENT
Start: 2022-10-27 | End: 2022-10-27 | Stop reason: HOSPADM

## 2022-10-27 RX ORDER — ONDANSETRON 2 MG/ML
INJECTION INTRAMUSCULAR; INTRAVENOUS AS NEEDED
Status: DISCONTINUED | OUTPATIENT
Start: 2022-10-27 | End: 2022-10-27 | Stop reason: HOSPADM

## 2022-10-27 RX ORDER — SODIUM CHLORIDE 0.9 % (FLUSH) 0.9 %
5-40 SYRINGE (ML) INJECTION AS NEEDED
Status: DISCONTINUED | OUTPATIENT
Start: 2022-10-27 | End: 2022-10-27 | Stop reason: HOSPADM

## 2022-10-27 RX ORDER — ALBUTEROL SULFATE 0.83 MG/ML
2.5 SOLUTION RESPIRATORY (INHALATION)
Status: DISCONTINUED | OUTPATIENT
Start: 2022-10-27 | End: 2022-10-27 | Stop reason: HOSPADM

## 2022-10-27 RX ORDER — LABETALOL HYDROCHLORIDE 5 MG/ML
10 INJECTION, SOLUTION INTRAVENOUS ONCE
Status: COMPLETED | OUTPATIENT
Start: 2022-10-27 | End: 2022-10-27

## 2022-10-27 RX ORDER — DIPHENHYDRAMINE HYDROCHLORIDE 50 MG/ML
12.5 INJECTION, SOLUTION INTRAMUSCULAR; INTRAVENOUS
Status: DISCONTINUED | OUTPATIENT
Start: 2022-10-27 | End: 2022-10-27 | Stop reason: HOSPADM

## 2022-10-27 RX ORDER — LIDOCAINE HYDROCHLORIDE 20 MG/ML
INJECTION, SOLUTION EPIDURAL; INFILTRATION; INTRACAUDAL; PERINEURAL AS NEEDED
Status: DISCONTINUED | OUTPATIENT
Start: 2022-10-27 | End: 2022-10-27 | Stop reason: HOSPADM

## 2022-10-27 RX ADMIN — HYDROCODONE BITARTRATE AND ACETAMINOPHEN 1 TABLET: 5; 325 TABLET ORAL at 23:11

## 2022-10-27 RX ADMIN — LABETALOL HYDROCHLORIDE 10 MG: 5 INJECTION INTRAVENOUS at 16:12

## 2022-10-27 RX ADMIN — SODIUM CHLORIDE, POTASSIUM CHLORIDE, SODIUM LACTATE AND CALCIUM CHLORIDE 75 ML/HR: 600; 310; 30; 20 INJECTION, SOLUTION INTRAVENOUS at 17:56

## 2022-10-27 RX ADMIN — FLUORESCEIN SODIUM 25 MG: 100 INJECTION INTRAVENOUS at 14:33

## 2022-10-27 RX ADMIN — GEMCITABINE HYDROCHLORIDE 1000 MG: 1 INJECTION, SOLUTION INTRAVENOUS at 14:38

## 2022-10-27 RX ADMIN — FENTANYL CITRATE 50 MCG: 50 INJECTION, SOLUTION INTRAMUSCULAR; INTRAVENOUS at 14:09

## 2022-10-27 RX ADMIN — ONDANSETRON 4 MG: 2 INJECTION INTRAMUSCULAR; INTRAVENOUS at 20:12

## 2022-10-27 RX ADMIN — LEVOFLOXACIN 500 MG: 5 INJECTION, SOLUTION INTRAVENOUS at 14:15

## 2022-10-27 RX ADMIN — SODIUM CHLORIDE, SODIUM LACTATE, POTASSIUM CHLORIDE, AND CALCIUM CHLORIDE 125 ML/HR: 600; 310; 30; 20 INJECTION, SOLUTION INTRAVENOUS at 11:12

## 2022-10-27 RX ADMIN — Medication 10 MG: at 14:16

## 2022-10-27 RX ADMIN — GEMCITABINE HYDROCHLORIDE 1000 MG: 1 INJECTION, SOLUTION INTRAVENOUS at 14:37

## 2022-10-27 RX ADMIN — HYDRALAZINE HYDROCHLORIDE 10 MG: 20 INJECTION INTRAMUSCULAR; INTRAVENOUS at 16:46

## 2022-10-27 RX ADMIN — SODIUM CHLORIDE, PRESERVATIVE FREE 10 ML: 5 INJECTION INTRAVENOUS at 20:12

## 2022-10-27 RX ADMIN — LIDOCAINE HYDROCHLORIDE 80 MG: 20 INJECTION, SOLUTION EPIDURAL; INFILTRATION; INTRACAUDAL; PERINEURAL at 14:13

## 2022-10-27 RX ADMIN — FENTANYL CITRATE 50 MCG: 50 INJECTION, SOLUTION INTRAMUSCULAR; INTRAVENOUS at 14:29

## 2022-10-27 RX ADMIN — ONDANSETRON HYDROCHLORIDE 4 MG: 2 INJECTION INTRAMUSCULAR; INTRAVENOUS at 14:28

## 2022-10-27 RX ADMIN — PROPOFOL 110 MG: 10 INJECTION, EMULSION INTRAVENOUS at 14:13

## 2022-10-27 RX ADMIN — Medication 10 MG: at 14:43

## 2022-10-27 RX ADMIN — HYDROCODONE BITARTRATE AND ACETAMINOPHEN 1 TABLET: 5; 325 TABLET ORAL at 18:46

## 2022-10-27 NOTE — BRIEF OP NOTE
Brief Postoperative Note    Patient: Cammy Erwin  YOB: 1933  MRN: [id number]    Date of Procedure: 10/27/2022     Pre-Op Diagnosis: BLADDER CANCER    Post-Op Diagnosis: Same as preoperative diagnosis.       Procedure(s):  CYSTOSCOPY TRANSURETHRAL RESECTION BLADDER TUMOR- MEDIUM WITH GEMCITABINE  **SPEC POP**    Surgeon(s):  Sergio Castorena MD    Surgical Assistant: None    Anesthesia: General     Estimated Blood Loss (mL): Minimal    Complications: None    Specimens:   ID Type Source Tests Collected by Time Destination   1 : BLADDER TUMOR Preservative Bladder  Sergio Castorena MD 10/27/2022 1439 Pathology        Implants: * No implants in log *    Drains: * No LDAs found *    Findings: Recurrent tumor left superior anterior wall    Electronically Signed by Javier Montalvo MD on 10/27/2022 at 2:53 PM

## 2022-10-27 NOTE — PERIOP NOTES
TRANSFER - IN REPORT:    Verbal report received from CRNA and OR nurse(name) on Tenzin H Shiver  being received from OR(unit) for routine post - op      Report consisted of patients Situation, Background, Assessment and   Recommendations(SBAR). Information from the following report(s) SBAR, Kardex, OR Summary, Procedure Summary, Intake/Output, and MAR was reviewed with the receiving nurse. Opportunity for questions and clarification was provided. Assessment completed upon patients arrival to unit and care assumed.

## 2022-10-27 NOTE — PROGRESS NOTES
[address] care at this time. 2010 - Patient A&Ox4, RA. Denies chest pain and SOB. Denies numbness/tingling/calf pain. Pain 6/10 with a tolerable level of 5/10. Pt c/o nausea, medication administered per MAR. Grossman draining and patent. SCD compression device bilaterally. Pt educated on IS use, q2h rounds, and pain management. Pt verbalized understanding, no concerns voiced. Call bell and telephone within reach, bed in lowest position. Pt encouraged to call for assistance. 5 - Grossman appears to have been leaking for a while. Bed saturated. Pt cleaned, linen changed. Diane Russ made aware of grossman leakage.

## 2022-10-27 NOTE — DISCHARGE INSTRUCTIONS
Reggie Anthony M.D. [address] [facility]  Office: [phone]  Fax:    [phone]    PROCEDURE: Procedure(s):  CYSTOSCOPY TRANSURETHRAL RESECTION BLADDER TUMOR- MEDIUM WITH GEMCITABINE  **SPEC POP**    Notify [facility] IMMEDIATELY if any of the following occur: You are unable to urinate. Urgency to urinate is not uncommon. You find yourself urinating small frequent amounts associated with severe lower abdominal discomfort. Bright red blood with clots in the urine. Some reddish urine is not uncommon and should be treated with increasing the amount of fluids you drink. Temperature above 101.5° and / or chills. You are nauseous and / or vomiting and you cannot hold down any fluids. Your pain is not controlled with the pain medication prescribed. Special Considerations:     Do not drive for at least 24 hours after the procedure and until you are no longer taking narcotic pain medication and you are able to move and react without hesitation. MEDICATIONS:  Pain   []  Norco®   []  Percocet®  [] Dilaudid®    []  Tramadol  [] Ketorolac   Antibiotics   []  Cipro   [x]  Keflex    [] Levaquin   []  Bactrim DS®      [] Cefuroxime   Urination   []  Vesicare®   []  Flomax      Burning   []  Pyridium®   []  UribelTM      Nausea   []  Zofran®   []  Phenergan®      Miscellaneous   []            [] Prescriptions Written on Chart    [x] Prescriptions sent Electronically           Our office will call you tomorrow to schedule your first follow-up appointment. Please contact [facility] at [phone] or go to the nearest Emergency Department / Urgent Care facility for any other medical questions or concerns.

## 2022-10-27 NOTE — PERIOP NOTES
TRANSFER - OUT REPORT:    Verbal report given to BISI Esqueda(name) on Tenzin H Shiver  being transferred to [address] [place] [address](unit) for routine post - op       Report consisted of patients Situation, Background, Assessment and   Recommendations(SBAR). Information from the following report(s) SBAR, Kardex, OR Summary, Procedure Summary, Intake/Output, and MAR was reviewed with the receiving nurse. Lines:   Peripheral IV 10/27/22 Anterior;Right Forearm (Active)   Site Assessment Clean, dry, & intact 10/27/22 1620   Phlebitis Assessment 0 10/27/22 1620   Infiltration Assessment 0 10/27/22 1620   Dressing Status Clean, dry, & intact; New 10/27/22 1620   Dressing Type Tape;Transparent 10/27/22 1620   Hub Color/Line Status Pink;Patent; Infusing 10/27/22 1620        Opportunity for questions and clarification was provided.       Patient transported with:   Registered Nurse

## 2022-10-27 NOTE — ROUTINE PROCESS
TRANSFER - IN REPORT:    Verbal report received from SHAYLEE Bailey RN(name) on [employer]  being received from PACU(unit) for routine post - op      Report consisted of patients Situation, Background, Assessment and   Recommendations(SBAR). Information from the following report(s) SBAR, Kardex, [facility] and Recent Results was reviewed with the receiving nurse. Opportunity for questions and clarification was provided. Assessment completed upon patients arrival to unit and care assumed.

## 2022-10-27 NOTE — ANESTHESIA POSTPROCEDURE EVALUATION
Procedure(s):  CYSTOSCOPY TRANSURETHRAL RESECTION BLADDER TUMOR- MEDIUM WITH GEMCITABINE  **SPEC POP**.    general    Anesthesia Post Evaluation      Multimodal analgesia: multimodal analgesia used between 6 hours prior to anesthesia start to PACU discharge  Patient location during evaluation: PACU  Patient participation: complete - patient participated  Level of consciousness: awake and alert  Pain score: 0  Airway patency: patent  Anesthetic complications: no  Cardiovascular status: acceptable  Respiratory status: acceptable  Hydration status: acceptable  Post anesthesia nausea and vomiting:  none  Final Post Anesthesia Temperature Assessment:  Normothermia (36.0-37.5 degrees C)      INITIAL Post-op Vital signs:   Vitals Value Taken Time   /114 10/27/22 1501   Temp 36.7 °C (98 °F) 10/27/22 1449   Pulse 73 10/27/22 1505   Resp 15 10/27/22 1505   SpO2 100 % 10/27/22 1505   Vitals shown include unvalidated device data.

## 2022-10-27 NOTE — INTERVAL H&P NOTE
Update History & Physical    The Patient's History and Physical of October 24, 2022 was reviewed with the patient and I examined the patient. There was no change. The surgical site was confirmed by the patient and me. Plan:  The risk, benefits, expected outcome, and alternative to the recommended procedure have been discussed with the patient. Patient understands and wants to proceed with the procedure.     Electronically signed by Adeline Hawley MD on 10/27/2022 at 12:04 PM

## 2022-10-27 NOTE — PERIOP NOTES
Reviewed PTA medication list with patient/caregiver and patient/caregiver denies any additional medications. Patient admits to having a responsible adult care for them at home for at least 24 hours after surgery. Patient encouraged to use gown warming system and informed that using said warming gown to regulate body temperature prior to a procedure has been shown to help reduce the risks of blood clots and infection. Patient's pharmacy of choice verified and documented in PTA medication section. Dual skin assessment & fall risk band verification completed with [address].

## 2022-10-27 NOTE — PERIOP NOTES
Spoke with Dr. Carolyn Ramos about high blood pressure 187/97 ordered to wait until Gemcitabine beads are released and then assess the blood pressure.

## 2022-10-28 VITALS
OXYGEN SATURATION: 100 % | HEIGHT: 69 IN | WEIGHT: 135.3 LBS | HEART RATE: 71 BPM | TEMPERATURE: 97.7 F | RESPIRATION RATE: 16 BRPM | SYSTOLIC BLOOD PRESSURE: 149 MMHG | BODY MASS INDEX: 20.04 KG/M2 | DIASTOLIC BLOOD PRESSURE: 94 MMHG

## 2022-10-28 PROCEDURE — 74011250637 HC RX REV CODE- 250/637: Performed by: UROLOGY

## 2022-10-28 PROCEDURE — G0378 HOSPITAL OBSERVATION PER HR: HCPCS

## 2022-10-28 PROCEDURE — 74011250636 HC RX REV CODE- 250/636: Performed by: UROLOGY

## 2022-10-28 RX ORDER — IBUPROFEN 600 MG/1
600 TABLET ORAL
Status: DISCONTINUED | OUTPATIENT
Start: 2022-10-28 | End: 2022-10-28 | Stop reason: HOSPADM

## 2022-10-28 RX ADMIN — SODIUM CHLORIDE, POTASSIUM CHLORIDE, SODIUM LACTATE AND CALCIUM CHLORIDE 75 ML/HR: 600; 310; 30; 20 INJECTION, SOLUTION INTRAVENOUS at 04:30

## 2022-10-28 RX ADMIN — HYDROCODONE BITARTRATE AND ACETAMINOPHEN 1 TABLET: 5; 325 TABLET ORAL at 04:30

## 2022-10-28 RX ADMIN — ONDANSETRON 4 MG: 2 INJECTION INTRAMUSCULAR; INTRAVENOUS at 04:30

## 2022-10-28 RX ADMIN — IBUPROFEN 600 MG: 600 TABLET, FILM COATED ORAL at 08:14

## 2022-10-28 NOTE — PROGRESS NOTES
conducted a post-surgery visit with Olivia Erwin, who is a 80 y.o.,male. The  provided the following Interventions:  Initiated a relationship of care and support. Addressed patient's ACP, he stated he has one at home. Encouraged him to have one brought to the [address] [place] [address] of prayers on patient's behalf. Plan:  Chaplains will continue to follow and will provide pastoral care on an as needed/requested basis.  recommends bedside caregivers page  on duty if patient shows signs of acute spiritual or emotional distress. Rev.  Marialuisa Whiteriver    ,Certified Respecting Choices Advance Care   Coordinator [place]  [phone]

## 2022-10-28 NOTE — PROGRESS NOTES
Urology Progress Note    Patient: Romeo Jackson MRN: [id number] SSN: [id number]    YOB: 1933  Age: 80 y.o. Sex: male    DOA: 10/27/2022 LOS:  LOS: 0 days              Subjective:   Pt doing well this morning still w headache. Had N/V which has resolved. Objective:    Visit Vitals  BP (!) 162/79 (BP 1 Location: Left upper arm, BP Patient Position: At rest)   Pulse 62   Temp 97.6 °F (36.4 °C)   Resp 18   Ht 5' 9\" (1.753 m)   Wt 61.4 kg (135 lb 4.8 oz)   SpO2 99%   BMI 19.98 kg/m²     Temp (24hrs), Av.9 °F (36.6 °C), Min:97.6 °F (36.4 °C), Max:98.1 °F (36.7 °C)      Intake and Output:  10/26 0701 - 10/27 1900  In: 129 [I.V.:600]  Out: 150 [Urine:150]  10/27 1901 - 10/28 0700  In: 50 [P.O.:50]  Out: 575 [Urine:575]    Physical Exam:  Grossman: clear    Medications Reviewed.     Assessment/Plan:   Active Problems:    S/P cystoscopy (10/27/2022)        Status Post:  Procedure(s):  CYSTOSCOPY TRANSURETHRAL RESECTION BLADDER TUMOR- MEDIUM WITH GEMCITABINE  **SPEC POP**   Impression: POD #1  S/P TURBT, doing well still with HA no other complaints    Plan:  Ibuprofen 600mg for HA  D/C grossman: done  D/C home after voiding    Campbell Lu MD  2022

## 2022-10-28 NOTE — ROUTINE PROCESS
Bedside and Verbal shift change report given to Demarcus Rizzo RN by Darlin Zuluaga RN. Report included the following information SBAR, Kardex, Intake/Output and MAR.

## 2022-10-28 NOTE — OP NOTES
Brief Postoperative Note     Patient: Olivia Erwin  YOB: 1933  MRN: [id number]     Date of Procedure: 10/27/2022      Pre-Op Diagnosis: BLADDER CANCER     Post-Op Diagnosis: Same as preoperative diagnosis. Procedure(s):  CYSTOSCOPY TRANSURETHRAL RESECTION BLADDER TUMOR- MEDIUM WITH GEMCITABINE  **SPEC POP**     Surgeon(s):  Lazarus Carina, MD     Surgical Assistant: None     Anesthesia: General      Estimated Blood Loss (mL): Minimal     Complications: None     Specimens:   ID Type Source Tests Collected by Time Destination   1 : BLADDER TUMOR Preservative Bladder   Lazarus Carina, MD 10/27/2022 1439 Pathology         Implants: * No implants in log *     Drains: * No LDAs found *     Findings: Recurrent tumor left superior anterior wall      Procedure Details: The patient was brought in the operating room and placed in the supine position. After the administration of general anesthesia, was placed in lithotomy position. I I then began by placing a 21-Welsh cystoscope into the bladder. Cystourethroscopy was performed. Both ureteral orifices were symmetrical and  flat and in the midline. Clear reflux was seen from both ureteral orifices. I identified the tumor, which was   A papillary on the left anterior wall near the bladder neck, located  I removed the cystoscope and then placed a 26-Welsh resectoscope into the bladder. Using the resection loop, I began to resect the entire tumor, down to muscle once there was no evidence of any obvious residual tumor, I used a roller bar and cauterized the entire base of the tumor. There were no residual lesions. There was no evidence of any obvious bleeding at this point. Both ureteral orifices remained intact,  25 Welsh Hood catheter was placed into the bladder. The patient's bladder was then emptied and I instilled 200 mg of  gemcitabine into patient's bladder, which was left instilled for 1 hour.   The Hood catheter was then clamped and the patient was then extubated. Patient was transferred to recovery room in stable condition.

## 2022-10-28 NOTE — PROGRESS NOTES
Verbal and Bedside change of shift report given to Vazquez Pepe RN by Randa Kendall RN. Opportunity for questions were provided. [facility] care of patient. Assessment complete at this time. Pt alert and oriented x 4. Lungs clear on room air. Patient denies shortness of breath/chest pain. No numbness and tingling in all extremities. 20 G IV to R. Forearm dressing clean, dry, & intact. Stated pain 0/10. SCD's in place. Incentive spirometer at bedside. Patient taught how to use breathing tool w/ proper demonstration. Instructed to use 10x Q1. Verbalized understanding. Last BM 10/27/2022. Patient oriented to room and call bell. Call bell within reach. Bed in lowest position. 1347   mg Oral Tylenol given for headache. Discharge instructions reviewed w/ patient and family. Verbal understanding of instructions. Opportunity for questions were provided.

## 2022-10-28 NOTE — ACP (ADVANCE CARE PLANNING)
Advance Care Planning   Advance Care Planning Inpatient Note  [place] Department    Today's Date: 10/28/2022  [facility]: [facility] [address] [facility]    Received request from [place]. Upon review of chart and communication with care team, patient's decision making abilities are not in question. Patient was/were present in the room during visit.     Goals of ACP Conversation:  Discuss Advance Care planning documents    Health Care Decision Makers:      Primary Decision Maker: Katrina Kellis - Daughter - [phone]    Summary:  [address] 46 (Patient Wishes) on file:  Healthcare Power of /Advance Directive appointment of Health care agent  Living Will/ Advance Directive  Anatomical Gift/Organ donation     Interventions:  Requested patient/family submit existing document(s) for our records: [address] of /Advance Directive appointment of Health care agent  Living Will/ Advance Directive  Anatomical Gift/Organ donation    Care Preferences Communicated:  No    Outcomes/Plan:  ACP Discussion Completed    Anitha Best on 10/28/2022 at 10:44 AM

## 2022-10-28 NOTE — DISCHARGE SUMMARY
Discharge Summary     Patient ID:  Luellen Phoenix  [id number]   72 y.o.  3/9/1933    Admit date: 10/27/2022    Discharge Date: 10/28/2022      Admitting Physician: Melissa White MD     Discharge Physician: Melissa White MD    Admission Diagnoses: S/P cystoscopy [Z98.890]    Last Procedure: Procedure(s):  CYSTOSCOPY TRANSURETHRAL RESECTION BLADDER TUMOR- MEDIUM WITH GEMCITABINE  **SPEC POP**    Discharge Diagnoses: Active Problems:    S/P cystoscopy (10/27/2022)         Discharge Condition: stable    Consults: None    Significant Diagnostic Studies: None     Hospital Course:   Normal hospital course for this procedure. Disposition: Home    Patient Instructions:   Current Discharge Medication List        CONTINUE these medications which have NOT CHANGED    Details   aspirin delayed-release 81 mg tablet       simvastatin (ZOCOR) 20 mg tablet Take 20 mg by mouth daily. tamsulosin (FLOMAX) 0.4 mg capsule Take 1 Capsule by mouth daily. Qty: 30 Capsule, Refills: 0      cyanocobalamin, vitamin B-12, (VITAMIN B-12 PO) Take  by mouth daily. zinc gluconate 30 mg tab Take  by mouth daily. Diet: Regular    Activity: No heavy lifting more than 10 lbs x 2 weeks    Follow-up Appointments   Procedures    FOLLOW UP VISIT Appointment in: Other (Specify) Voiding trial my office tomorrow morning     Voiding trial my office tomorrow morning     Standing Status:   Standing     Number of Occurrences:   1     Order Specific Question:   Appointment in     Answer:    Other (Specify)          Signed:  Melissa White MD  October 28, 2022  6:21 AM

## 2022-10-28 NOTE — PROGRESS NOTES
Oral and Written notification given to patient and/or caregiver informing them that they are currently an Outpatient receiving care in our facility. Outpatient services include Observation Services under [facility] and [facility] requirements.

## 2023-12-18 PROBLEM — E78.00 HYPERCHOLESTEREMIA: Status: ACTIVE | Noted: 2023-12-18

## 2023-12-20 NOTE — H&P
inhalation route  every day at the same time each day 2023   N   metoprolol tartrate 100 mg tablet Take 1 Tab by Mouth Twice Daily. 2023   Y   polyethylene glycol 3350 17 gram oral powder packet Take 1 Packet by Mouth Every Night at Bedtime. 2023   Y   aspirin 81 mg tablet,delayed release take 1 tablet by oral route  every day 10/17/2023   N       Medication Reconciliation  Medications reconciled today. Allergies  Ingredient Reaction (Severity) Comment   SULFA (SULFONAMIDE ANTIBIOTICS) Wheezing      Reviewed, no changes. Family History   (Reviewed, updated)    Relationship Family Member Name  Age at Death Condition Onset Age Cause of Death   Father  Y  coronary arteriosclerosis 68 Y   Mother    Fracture of proximal end of femur ( following hip fracture)  Y   Mother    malignant neoplasm of uterus  N     Social History  (Reviewed, updated)  Tobacco use reviewed. Preferred language is [place]. Marital Status/Family/Social Support  Marital status:      Tobacco use status: Ex-cigarette smoker. Smoking status: Former smoker. Tobacco Screening  Patient has used tobacco.     Smoking Status  Type Smoking Status Usage Per Day Years Used Pack Years Total Pack Years   Cigarette Former smoker         Tobacco/Vaping Exposure  No passive vaping exposure. Alcohol  There is a history of alcohol use. 2 drinks consumed regular basis. Caffeine  The patient uses caffeine: coffee - 2 cups a day. Review of Systems  System Neg/Pos Details   Constitutional Negative Chills and Fever. ENMT Negative Ear infections and Sore throat. Eyes Negative Blurred vision, Double vision and Eye pain. Respiratory Negative Asthma, Chronic cough, Dyspnea and Wheezing. Cardio Negative Chest pain. GI Negative Constipation, Decreased appetite, Diarrhea, Nausea and Vomiting. Endocrine Negative Cold intolerance, Heat intolerance, Increased thirst and Weight loss.    Neuro Negative Headache and Tremors. Psych Negative Anxiety and Depression. Integumentary Negative Itching skin and Rash. MS Negative Back pain and Joint pain. Hema/Lymph Negative Easy bleeding. Reproductive Negative Sexual dysfunction. Vital Signs   Height  Time ft in cm Last Measured Height Position    1:01 PM 5.0 4.96 165.00 06/30/2023 Standing     Weight/BSA/BMI  Time lb oz kg Context BMI kg/m2 BSA m2    1:01 .60  56.971 dressed with shoes 20.93      Measured by  Time Measured by    1:01 PM Taryn Joshi     Physical Exam  Exam Findings Details   Back/Spine Comments I removed the dressing over the left nephrostomy tube. The skin under the dressing was red inflamed and there would purely drainage around the dressing. As I washed the area with Hibiclens wash and placed sterile gauze and Tegaderm over the dressing. Immunizations Entered by History  Date Immunization   10/16/2023 12:00:00 AM influenza, high-dose seasonal, quadrivalent, 0.7mL dose, preservative free   12/21/2019 12:00:00 AM Influenza High-Dose Preservative Free   11/19/2010 12:00:00 AM Influenza, NOS   12/11/2008 12:00:00 AM Influenza   11/19/2010 12:00:00 AM Pneumococcal 23   1/9/2007 12:00:00 AM Influenza   9/26/2023 12:00:00 AM Influenza   1/5/2015 12:00:00 AM Influenza, NOS   10/1/2014 12:00:00 AM Influenza, NOS   1/1/2006 12:00:00 AM Pneumococcal 23   Medications (added, continued, or stopped today)  Start Date Medication Directions PRN Status PRN Reason Instruction Stop Date   10/17/2023 aspirin 81 mg tablet,delayed release take 1 tablet by oral route  every day N      11/21/2023 cephalexin 500 mg capsule take 1 capsule by oral route  every 8 hours N      09/25/2023 metoprolol tartrate 100 mg tablet Take 1 Tab by Mouth Twice Daily. N      09/25/2023 polyethylene glycol 3350 17 gram oral powder packet Take 1 Packet by Mouth Every Night at Bedtime.  N      08/04/2023 Trelegy Ellipta 100 mcg-62.5 mcg-25 mcg powder for inhalation

## 2023-12-21 ENCOUNTER — APPOINTMENT (OUTPATIENT)
Facility: HOSPITAL | Age: 88
End: 2023-12-21
Attending: UROLOGY
Payer: MEDICARE

## 2023-12-21 ENCOUNTER — HOSPITAL ENCOUNTER (OUTPATIENT)
Facility: HOSPITAL | Age: 88
Setting detail: OBSERVATION
Discharge: HOME | End: 2023-12-22
Attending: UROLOGY | Admitting: UROLOGY
Payer: MEDICARE

## 2023-12-21 PROBLEM — Z85.51 HISTORY OF BLADDER CANCER: Status: ACTIVE | Noted: 2023-12-21

## 2023-12-21 PROCEDURE — 3600000002 HC SURGERY LEVEL 2 BASE: Performed by: UROLOGY

## 2023-12-21 PROCEDURE — C1726 CATH, BAL DIL, NON-VASCULAR: HCPCS | Performed by: UROLOGY

## 2023-12-21 PROCEDURE — 7100000000 HC PACU RECOVERY - FIRST 15 MIN: Performed by: UROLOGY

## 2023-12-21 PROCEDURE — 6370000000 HC RX 637 (ALT 250 FOR IP): Performed by: UROLOGY

## 2023-12-21 PROCEDURE — 74420 UROGRAPHY RTRGR +-KUB: CPT

## 2023-12-21 PROCEDURE — 6360000002 HC RX W HCPCS: Performed by: ANESTHESIOLOGY

## 2023-12-21 PROCEDURE — 2580000003 HC RX 258: Performed by: UROLOGY

## 2023-12-21 PROCEDURE — A4217 STERILE WATER/SALINE, 500 ML: HCPCS | Performed by: UROLOGY

## 2023-12-21 PROCEDURE — G0378 HOSPITAL OBSERVATION PER HR: HCPCS

## 2023-12-21 PROCEDURE — 3600000012 HC SURGERY LEVEL 2 ADDTL 15MIN: Performed by: UROLOGY

## 2023-12-21 PROCEDURE — 7100000001 HC PACU RECOVERY - ADDTL 15 MIN: Performed by: UROLOGY

## 2023-12-21 PROCEDURE — 3700000000 HC ANESTHESIA ATTENDED CARE: Performed by: UROLOGY

## 2023-12-21 PROCEDURE — 2720000010 HC SURG SUPPLY STERILE: Performed by: UROLOGY

## 2023-12-21 PROCEDURE — C2617 STENT, NON-COR, TEM W/O DEL: HCPCS | Performed by: UROLOGY

## 2023-12-21 PROCEDURE — 88305 TISSUE EXAM BY PATHOLOGIST: CPT

## 2023-12-21 PROCEDURE — 2709999900 HC NON-CHARGEABLE SUPPLY: Performed by: UROLOGY

## 2023-12-21 PROCEDURE — 3700000001 HC ADD 15 MINUTES (ANESTHESIA): Performed by: UROLOGY

## 2023-12-21 PROCEDURE — 6360000004 HC RX CONTRAST MEDICATION: Performed by: UROLOGY

## 2023-12-21 DEVICE — URETERAL STENT
Type: IMPLANTABLE DEVICE | Site: URETER | Status: FUNCTIONAL
Brand: POLARIS™ ULTRA

## 2023-12-21 RX ORDER — SODIUM CHLORIDE 450 MG/100ML
INJECTION, SOLUTION INTRAVENOUS CONTINUOUS
Status: DISCONTINUED | OUTPATIENT
Start: 2023-12-21 | End: 2023-12-22 | Stop reason: HOSPADM

## 2023-12-21 RX ORDER — LEVOFLOXACIN 5 MG/ML
500 INJECTION, SOLUTION INTRAVENOUS
Status: COMPLETED | OUTPATIENT
Start: 2023-12-21 | End: 2023-12-21

## 2023-12-21 RX ORDER — SODIUM CHLORIDE 0.9 % (FLUSH) 0.9 %
5-40 SYRINGE (ML) INJECTION EVERY 12 HOURS SCHEDULED
Status: DISCONTINUED | OUTPATIENT
Start: 2023-12-21 | End: 2023-12-22 | Stop reason: HOSPADM

## 2023-12-21 RX ORDER — IPRATROPIUM BROMIDE AND ALBUTEROL SULFATE 2.5; .5 MG/3ML; MG/3ML
1 SOLUTION RESPIRATORY (INHALATION)
Status: DISCONTINUED | OUTPATIENT
Start: 2023-12-21 | End: 2023-12-21 | Stop reason: HOSPADM

## 2023-12-21 RX ORDER — SODIUM CHLORIDE 0.9 % (FLUSH) 0.9 %
5-40 SYRINGE (ML) INJECTION PRN
Status: DISCONTINUED | OUTPATIENT
Start: 2023-12-21 | End: 2023-12-22 | Stop reason: HOSPADM

## 2023-12-21 RX ORDER — LABETALOL HYDROCHLORIDE 5 MG/ML
10 INJECTION, SOLUTION INTRAVENOUS
Status: DISCONTINUED | OUTPATIENT
Start: 2023-12-21 | End: 2023-12-21 | Stop reason: HOSPADM

## 2023-12-21 RX ORDER — DIPHENHYDRAMINE HYDROCHLORIDE 50 MG/ML
12.5 INJECTION INTRAMUSCULAR; INTRAVENOUS
Status: DISCONTINUED | OUTPATIENT
Start: 2023-12-21 | End: 2023-12-21 | Stop reason: HOSPADM

## 2023-12-21 RX ORDER — ONDANSETRON 4 MG/1
4 TABLET, ORALLY DISINTEGRATING ORAL EVERY 8 HOURS PRN
Status: DISCONTINUED | OUTPATIENT
Start: 2023-12-21 | End: 2023-12-22 | Stop reason: HOSPADM

## 2023-12-21 RX ORDER — HYDROMORPHONE HYDROCHLORIDE 1 MG/ML
0.5 INJECTION, SOLUTION INTRAMUSCULAR; INTRAVENOUS; SUBCUTANEOUS EVERY 5 MIN PRN
Status: DISCONTINUED | OUTPATIENT
Start: 2023-12-21 | End: 2023-12-21 | Stop reason: HOSPADM

## 2023-12-21 RX ORDER — ONDANSETRON 2 MG/ML
4 INJECTION INTRAMUSCULAR; INTRAVENOUS EVERY 6 HOURS PRN
Status: DISCONTINUED | OUTPATIENT
Start: 2023-12-21 | End: 2023-12-22 | Stop reason: HOSPADM

## 2023-12-21 RX ORDER — SODIUM CHLORIDE 9 MG/ML
INJECTION, SOLUTION INTRAVENOUS PRN
Status: CANCELLED | OUTPATIENT
Start: 2023-12-21

## 2023-12-21 RX ORDER — PROCHLORPERAZINE EDISYLATE 5 MG/ML
5 INJECTION INTRAMUSCULAR; INTRAVENOUS
Status: DISCONTINUED | OUTPATIENT
Start: 2023-12-21 | End: 2023-12-21 | Stop reason: HOSPADM

## 2023-12-21 RX ORDER — SODIUM CHLORIDE 0.9 % (FLUSH) 0.9 %
5-40 SYRINGE (ML) INJECTION EVERY 12 HOURS SCHEDULED
Status: DISCONTINUED | OUTPATIENT
Start: 2023-12-21 | End: 2023-12-21 | Stop reason: HOSPADM

## 2023-12-21 RX ORDER — HYDROCODONE BITARTRATE AND ACETAMINOPHEN 5; 325 MG/1; MG/1
1 TABLET ORAL EVERY 6 HOURS PRN
Status: DISCONTINUED | OUTPATIENT
Start: 2023-12-21 | End: 2023-12-22 | Stop reason: HOSPADM

## 2023-12-21 RX ORDER — LEVOFLOXACIN 500 MG/1
250 TABLET, FILM COATED ORAL
Status: DISCONTINUED | OUTPATIENT
Start: 2023-12-21 | End: 2023-12-21

## 2023-12-21 RX ORDER — FENTANYL CITRATE 50 UG/ML
25 INJECTION, SOLUTION INTRAMUSCULAR; INTRAVENOUS EVERY 5 MIN PRN
Status: DISCONTINUED | OUTPATIENT
Start: 2023-12-21 | End: 2023-12-21 | Stop reason: HOSPADM

## 2023-12-21 RX ORDER — LEVOFLOXACIN 500 MG/1
250 TABLET, FILM COATED ORAL
Status: DISCONTINUED | OUTPATIENT
Start: 2023-12-22 | End: 2023-12-21

## 2023-12-21 RX ORDER — SODIUM CHLORIDE 0.9 % (FLUSH) 0.9 %
5-40 SYRINGE (ML) INJECTION PRN
Status: DISCONTINUED | OUTPATIENT
Start: 2023-12-21 | End: 2023-12-21 | Stop reason: HOSPADM

## 2023-12-21 RX ORDER — OXYCODONE HYDROCHLORIDE 5 MG/1
5 TABLET ORAL
Status: DISCONTINUED | OUTPATIENT
Start: 2023-12-21 | End: 2023-12-21 | Stop reason: HOSPADM

## 2023-12-21 RX ORDER — ONDANSETRON 2 MG/ML
4 INJECTION INTRAMUSCULAR; INTRAVENOUS
Status: DISCONTINUED | OUTPATIENT
Start: 2023-12-21 | End: 2023-12-21 | Stop reason: HOSPADM

## 2023-12-21 RX ORDER — SODIUM CHLORIDE 9 MG/ML
INJECTION, SOLUTION INTRAVENOUS PRN
Status: DISCONTINUED | OUTPATIENT
Start: 2023-12-21 | End: 2023-12-21 | Stop reason: HOSPADM

## 2023-12-21 RX ORDER — LEVOFLOXACIN 500 MG/1
250 TABLET, FILM COATED ORAL
Status: DISCONTINUED | OUTPATIENT
Start: 2023-12-23 | End: 2023-12-22 | Stop reason: HOSPADM

## 2023-12-21 RX ORDER — SODIUM CHLORIDE, SODIUM LACTATE, POTASSIUM CHLORIDE, CALCIUM CHLORIDE 600; 310; 30; 20 MG/100ML; MG/100ML; MG/100ML; MG/100ML
INJECTION, SOLUTION INTRAVENOUS CONTINUOUS
Status: DISCONTINUED | OUTPATIENT
Start: 2023-12-21 | End: 2023-12-22 | Stop reason: HOSPADM

## 2023-12-21 RX ORDER — ACETAMINOPHEN 325 MG/1
650 TABLET ORAL EVERY 6 HOURS PRN
Status: DISCONTINUED | OUTPATIENT
Start: 2023-12-21 | End: 2023-12-22 | Stop reason: HOSPADM

## 2023-12-21 RX ADMIN — SODIUM CHLORIDE, POTASSIUM CHLORIDE, SODIUM LACTATE AND CALCIUM CHLORIDE: 600; 310; 30; 20 INJECTION, SOLUTION INTRAVENOUS at 08:22

## 2023-12-21 RX ADMIN — BENZOCAINE 6 MG-MENTHOL 10 MG LOZENGES 1 LOZENGE: at 22:17

## 2023-12-21 RX ADMIN — SODIUM CHLORIDE: 4.5 INJECTION, SOLUTION INTRAVENOUS at 12:28

## 2023-12-21 RX ADMIN — LABETALOL HYDROCHLORIDE 10 MG: 5 INJECTION, SOLUTION INTRAVENOUS at 10:52

## 2023-12-21 RX ADMIN — ACETAMINOPHEN 650 MG: 325 TABLET ORAL at 17:03

## 2023-12-21 RX ADMIN — BENZOCAINE 6 MG-MENTHOL 10 MG LOZENGES 1 LOZENGE: at 17:01

## 2023-12-21 RX ADMIN — BENZOCAINE 6 MG-MENTHOL 10 MG LOZENGES 1 LOZENGE: at 20:20

## 2023-12-21 NOTE — INTERVAL H&P NOTE
Update History & Physical    The patient's History and Physical of December 21, 2023 was reviewed with the patient and I examined the patient. There was no change. The surgical site was confirmed by the patient and me. Plan: The risks, benefits, expected outcome, and alternative to the recommended procedure have been discussed with the patient. Patient understands and wants to proceed with the procedure.      Electronically signed by Gerri London MD on 12/21/2023 at 9:04 AM

## 2023-12-21 NOTE — PROGRESS NOTES
Pharmacy Dosing Services:     Pharmacist Renal Dosing  Note for Danae Conti   Physician Dr. Fausto Oglesby    Indication: Surgical Prophylaxis  Previous Regimen Levofloxacin 250 mg IV q24h   Serum Creatinine No results found for: \"YUNG\", \"CREA\", \"CREAPOC\"   Creatinine Clearance Estimated Creatinine Clearance: 13 mL/min (A) (based on SCr of 3.01 mg/dL (H)).    BUN Lab Results   Component Value Date/Time    BUN 42 12/18/2023 08:09 AM       New Regimen: Levofloxacin 250 mg IV q48h d/t CrCl <20 mL/min    Salvatore Munson, [facility] , PharmD  [phone]

## 2023-12-21 NOTE — PROGRESS NOTES
1144  TRANSFER - IN REPORT:    Verbal report received from Armando Terrell RN on [facility]  being received from PACU for routine post-op      Report consisted of patient's Situation, Background, Assessment and   Recommendations(SBAR). Information from the following report(s) Nurse Handoff Report, Surgery Report, and MAR was reviewed with the receiving nurse. Opportunity for questions and clarification was provided. Assessment completed upon patient's arrival to unit and care assumed.

## 2023-12-21 NOTE — PROGRESS NOTES
1135  Received client from PACU in satisfactory condition. Client is a pt of Dr. Augie Fontenot. Pt had a Cysto, Bladder Biposies, and Left Retrograde Pyelogram  today. Client is A/O X 4. Client is calm and cooperative. Client denies numbness or tingling to any extremity. With + Radial,Posterior Tibial and Dorsalis Pedis pulses. Capillary Refill less than 3 seconds. Skin is warm , dry and skin color is appropriate to race. Client is negative for JVD. Bibasilar breath sounds clear. No use of accessory muscles. Bowel sounds active to all quadrants. Abdomen is soft and slightly tender to touch. . Client has not voided post-operatively. No bladder distention evident. No complaints of bladder discomfort. Client has  gauze/Tegaderm to left flank . Dressing is dry and intact. . Sequential compression device applied. Client has LFA 20 gauge PIV present and runningNSS at 75 ml/hr. Clients pain is 0/10 on 0-10 scale. Client oriented to call bell use as well as bed use. Client oriented to phone and how to order meals. Call bell within reach. Bed in low position. Three side rails up. Armbands/ Fall risk band intact. Dual skin check done with Ignacio Mar RN. Pt has bruising to Upper and lower lower  extremities  and redness to sacral area. 18   Pt assisted out of bed and stood at bedside to urinate. Pt voided 50 ml of clear yellow urine. 1444   Pt is awake, alert, oriented x4. Denies pain. Assisted out of bed . Stood at bedside to void. 1547   Pt ambulated in hallway. Did well with ambulation. Voided 100 ml of clear yellow urine. 1642   Pt complaining of sore throat. Dr Becky Thornton was made aware. With order for Cepacol lozenges. 1705   Pt given Cepacol lozenge for sorethroat and Tylenol 650 mg po for headache.

## 2023-12-21 NOTE — BRIEF OP NOTE
Brief Postoperative Note      Patient: Leisa Cao  YOB: 1933  MRN: [id number]    Date of Procedure: 12/21/2023    Preop diagnosis left hydronephrosis, history of bladder cancer, abnormal urine cytology, left indwelling ureteral stent and indwelling nephrostomy tube    Post-Op Diagnosis: Same       Procedure: Cystoscopy random bladder biopsies left double-J stent removal, left retrograde pyelogram, removal of left nephrostomy tube, left ureteral biopsies and placement of left double-J stent    Surgeon(s):  Adriana Hidalgo MD    Assistant:  Surgical Assistant: Darin Monroe    Anesthesia: General    Estimated Blood Loss (mL): Minimal    Complications: None    Specimens:   ID Type Source Tests Collected by Time Destination   A : random bladder biopsy Tissue Bladder SURGICAL PATHOLOGY Adriana Hidalgo MD 12/21/2023 0945    B : LEFT URETERAL TUMOR BIOPSY AND BRUSHINGS Tissue Ureter SURGICAL PATHOLOGY Adriana Hidalgo MD 12/21/2023 1010        Implants:  Implant Name Type Inv. Item Serial No.  Lot No. LRB No. Used Action   STENT URET 6FR L24CM PERCFLX HYDR+ DBL PGTL THRD 2 - [id number]  STENT URET 6FR L24CM PERCFLX HYDR+ DBL PGTL THRD 2  [facility]- [id number] Left 1 Implanted         Drains:   Nephrostomy Tube Left Flank (Active)   Dressing Status Dry; Intact; Old drainage noted 12/21/23 0846       Findings: No abnormalities in the bladder, there was an area of erythema on the medial side of the mid ureter      Electronically signed by Amy Cohen MD on 12/21/2023 at 10:34 AM

## 2023-12-22 VITALS
RESPIRATION RATE: 17 BRPM | OXYGEN SATURATION: 100 % | WEIGHT: 126.2 LBS | BODY MASS INDEX: 18.69 KG/M2 | HEART RATE: 60 BPM | SYSTOLIC BLOOD PRESSURE: 169 MMHG | HEIGHT: 69 IN | TEMPERATURE: 97.6 F | DIASTOLIC BLOOD PRESSURE: 89 MMHG

## 2023-12-22 PROCEDURE — G0378 HOSPITAL OBSERVATION PER HR: HCPCS

## 2023-12-22 PROCEDURE — 6370000000 HC RX 637 (ALT 250 FOR IP): Performed by: UROLOGY

## 2023-12-22 PROCEDURE — 2580000003 HC RX 258: Performed by: UROLOGY

## 2023-12-22 RX ADMIN — BENZOCAINE 6 MG-MENTHOL 10 MG LOZENGES 1 LOZENGE: at 03:02

## 2023-12-22 RX ADMIN — SODIUM CHLORIDE: 4.5 INJECTION, SOLUTION INTRAVENOUS at 03:03

## 2023-12-22 NOTE — DISCHARGE SUMMARY
Discharge Summary     Patient ID:  Melvina Ferraro  [id number]   64 y.o.  3/9/1933    Admit date: 12/21/2023    Discharge Date: 12/22/2023      Admitting Physician: Mariia Monaco MD     Discharge Physician: Christina Love MD    Admission Diagnoses: Bladder neoplasm [D49.4]  Tumors of body of uterus, delivered [O34.599, D49.59]  Hydronephrosis, unspecified hydronephrosis type [N13.30]  History of bladder cancer [Z85.51]    Last Procedure: Procedure(s):  CYSTOSCOPY, BLADDER BIOPSIES,  LEFT RETROGRADE PYELOGRAM,  LEFT STENT PLACEMENT,  LEFT URETEROSCOPY, LEFT  URETERAL BIOPSIES OP 23    Discharge Diagnoses: Principal Problem:    History of bladder cancer  Resolved Problems:    * No resolved hospital problems. *       Discharge Condition: stable    Consults: none    Significant Diagnostic Studies: none     Hospital Course:   Normal hospital course for this procedure.     Disposition: Rehab    Patient Instructions:   Current Discharge Medication List        CONTINUE these medications which have NOT CHANGED    Details   acetaminophen (TYLENOL) 325 MG tablet Take 2 tablets by mouth every 6 hours as needed for Pain             Diet: Regular diet    Activity: As tolerated    Pt can shower today    He has Tegaderm on LEFT flank that can be removed Tuesday morning and replaced with band-aid if needed    My office will call pt for f/u        Signed:  Christina Love MD  December 22, 2023  6:30 AM

## 2023-12-22 NOTE — PROGRESS NOTES
1902 - Assumed care at this time. 1947 - Patient A&Ox4, RA. Denies chest pain and SOB. Gauze/transparent dressing to left lower back C/D/I. Denies numbness/tingling/calf pain. Pain 0/10 with a tolerable level of 3/10. SCD compression device bilaterally. Pt educated on unit routine, IS use, q2h rounds, and pain management. Pt verbalized understanding, no concerns voiced. Call bell and telephone within reach, bed in lowest position. Pt encouraged to call for assistance. 6026 - Patient ambulated OOB to BR. No concerns voiced. Call bell within reach, bed in lowest position. Pt encouraged to call for assistance. 7853 - Patient ambulated OOB to BR. No concerns voiced. Call bell within reach, bed in lowest position. Pt encouraged to call for assistance.

## 2023-12-22 NOTE — OP NOTE
Brief Postoperative Note        Patient: Speedy Cao  YOB: 1933  MRN: [id number]     Date of Procedure: 12/21/2023     Preop diagnosis left hydronephrosis, history of bladder cancer, abnormal urine cytology, left indwelling ureteral stent and indwelling nephrostomy tube     Post-Op Diagnosis: Same       Procedure: Cystoscopy random bladder biopsies left double-J stent removal, left retrograde pyelogram, removal of left nephrostomy tube, left ureteral biopsies and placement of left double-J stent     Surgeon(s):  Lucero Peralta MD     Assistant:  Surgical Assistant: Marco Montoya     Anesthesia: General     Estimated Blood Loss (mL): Minimal     Complications: None     Specimens:   ID Type Source Tests Collected by Time Destination   A : random bladder biopsy Tissue Bladder SURGICAL PATHOLOGY Lucero Peralta MD 12/21/2023 0945     B : LEFT URETERAL TUMOR BIOPSY AND BRUSHINGS Tissue Ureter SURGICAL PATHOLOGY Lucero Peralta MD 12/21/2023 1010           Implants:  Implant Name Type Inv. Item Serial No.  Lot No. LRB No. Used Action   STENT URET 6FR L24CM PERCFLX HYDR+ DBL PGTL THRD 2 - [id number]   STENT URET 6FR L24CM PERCFLX HYDR+ DBL PGTL THRD 2   [facility]OLOGY- [id number] Left 1 Implanted          Drains:        Nephrostomy Tube Left Flank (Active)   Dressing Status Dry; Intact; Old drainage noted 12/21/23 0846         Findings: No abnormalities in the bladder, there was an area of erythema on the medial side of the mid ureter    Procedure:  Patient brought the operating room placed in the supine position. After the administration of general anesthesia he was placed into the lithotomy position. Groin and genitalia were prepped and draped in the usual sterile fashion. I began with a 24 Micronesian cystoscope cystourethroscopy was performed no significant findings were identified in the bladder there was no obvious erythema or papillary tumors.   I began with the grasping forceps and took

## 2023-12-22 NOTE — PROGRESS NOTES
SW met patient at bedside in regards to DC plans. Patient reported that he had ambulated and upon discharge he would be transported home by either his son or two personal caregivers: David Loya and Migdalia Manning. Patient reported his caregivers are with him daily, between hours of 9 am to 7 pm  to support him during recovery at home. Patient reported that he did not utilize any DMEs. SW informed the patient about the follow up appointment that is scheduled for Tuesday, and that he doctor office would contact him. Patient has no discharge needs.

## 2023-12-22 NOTE — PROGRESS NOTES
Verbal shift change report given to Dhaval Tompkins RN (oncoming nurse) by Natasha Jacob RN (offgoing nurse). Report included the following information Nurse Handoff Report, Surgery Report, Intake/Output, and MAR.

## 2023-12-22 NOTE — PROGRESS NOTES
3433    Pt is awake, alert, oriented x4. Resting in bed. No complaints of pain. Gauze and tegaderm dressing to left flank is dry and intact. Pt for discharge today. 0836  Pt ambulated to BR to void and had a bowel movement. Pt went back to bed and served breakfast.  Lungs are clear. Abdomen soft with active BS. INT removed. Disccharge Instructions given. Dual AVS reviewed with A GREGORY Woodard. Sagar Dover All medications reviewed individually with patient. Opportunities for questions and concerns provided. Patient's arm band appropriately discarded. 18   Pt discharged per wheelchair accompanied by family member.

## (undated) DEVICE — STRAP,POSITIONING,KNEE/BODY,FOAM,4X60": Brand: MEDLINE

## (undated) DEVICE — SPONGE GZ W4XL4IN COT 12 PLY TYP VII WVN C FLD DSGN

## (undated) DEVICE — GLOVE ORANGE PI 7   MSG9070

## (undated) DEVICE — REM POLYHESIVE ADULT PATIENT RETURN ELECTRODE: Brand: VALLEYLAB

## (undated) DEVICE — GLOVE ORANGE PI 7 1/2   MSG9075

## (undated) DEVICE — PAD,NON-ADHERENT,3X8,STERILE,LF,1/PK: Brand: MEDLINE

## (undated) DEVICE — URETEROSCOPIC BIOPSY FORCEPS: Brand: PIRANHA

## (undated) DEVICE — CATH URET 5FRX70CM W/OPN END -- BX/20

## (undated) DEVICE — SOLUTION IRRIG 1500ML STRL H2O AQUALITE PLAS POUR BTL

## (undated) DEVICE — CYSTO PACK: Brand: MEDLINE INDUSTRIES, INC.

## (undated) DEVICE — Device

## (undated) DEVICE — GARMENT,MEDLINE,DVT,INT,CALF,MED, GEN2: Brand: MEDLINE

## (undated) DEVICE — GDWIRE UROL STR 150CM FLX TP -- BX/5 SENSOR

## (undated) DEVICE — DRAINBAG,ANTI-REFLUX TOWER,L/F,2000ML,LL: Brand: MEDLINE

## (undated) DEVICE — ELECTRODE PT RET AD L9FT HI MOIST COND ADH HYDRGEL CORDED

## (undated) DEVICE — SOLUTION IRRIG 3000ML H2O STRL BAG

## (undated) DEVICE — MARKER,SKIN,WI/RULER AND LABELS: Brand: MEDLINE

## (undated) DEVICE — TUBING, SUCTION, 1/4" X 12', STRAIGHT: Brand: MEDLINE

## (undated) DEVICE — SYRINGE,TOOMEY,IRRIGATION,70CC,STERILE: Brand: MEDLINE

## (undated) DEVICE — BALLOON DILATATION CATHETER: Brand: UROMAX ULTRA

## (undated) DEVICE — CATHETER URETH 22FR 30CC BLLN F 3 W SPEC M RND TIP TWO

## (undated) DEVICE — CYSTO PACK: Brand: CARDINAL HEALTH

## (undated) DEVICE — INFLATION DEVICE: Brand: ENCORE 26

## (undated) DEVICE — BAG URIN LEG DISPOZ-A-BG 19OZ -- W/18IN EXT TUBING

## (undated) DEVICE — DEVICE SECUREMENT 1/32IN POLYETH FOAM F ANCHR URIN CATH

## (undated) DEVICE — CATHETER F BLLN 5CC 22FR 2 W HYDRGEL COAT LESS TRAUM LUB

## (undated) DEVICE — PLUG CATH TAPR GRP HNDL CAP

## (undated) DEVICE — CUTTING LOOP, BIPOLAR, 24/26 FR.: Brand: N.A.

## (undated) DEVICE — APTRA SINGLE USE URETEROSCOPE